# Patient Record
Sex: FEMALE | Race: WHITE | Employment: OTHER | URBAN - METROPOLITAN AREA
[De-identification: names, ages, dates, MRNs, and addresses within clinical notes are randomized per-mention and may not be internally consistent; named-entity substitution may affect disease eponyms.]

---

## 2017-04-13 ENCOUNTER — HOSPITAL ENCOUNTER (OUTPATIENT)
Age: 57
Discharge: HOME | End: 2017-04-13
Payer: COMMERCIAL

## 2017-04-13 ENCOUNTER — APPOINTMENT (OUTPATIENT)
Dept: GENERAL RADIOLOGY | Age: 57
End: 2017-04-13
Attending: PHYSICIAN ASSISTANT
Payer: COMMERCIAL

## 2017-04-13 VITALS
BODY MASS INDEX: 24 KG/M2 | HEART RATE: 80 BPM | SYSTOLIC BLOOD PRESSURE: 174 MMHG | TEMPERATURE: 98 F | RESPIRATION RATE: 16 BRPM | WEIGHT: 150 LBS | OXYGEN SATURATION: 100 % | DIASTOLIC BLOOD PRESSURE: 102 MMHG

## 2017-04-13 DIAGNOSIS — S92.404B OPEN NONDISPLACED FRACTURE OF PHALANX OF RIGHT GREAT TOE, UNSPECIFIED PHALANX, INITIAL ENCOUNTER: Primary | ICD-10-CM

## 2017-04-13 DIAGNOSIS — S91.111A LACERATION OF RIGHT GREAT TOE WITHOUT FOREIGN BODY PRESENT, NAIL DAMAGE STATUS UNSPECIFIED, INITIAL ENCOUNTER: ICD-10-CM

## 2017-04-13 DIAGNOSIS — R03.0 ELEVATED BLOOD-PRESSURE READING WITHOUT DIAGNOSIS OF HYPERTENSION: ICD-10-CM

## 2017-04-13 PROCEDURE — 99204 OFFICE O/P NEW MOD 45 MIN: CPT

## 2017-04-13 PROCEDURE — 12002 RPR S/N/AX/GEN/TRNK2.6-7.5CM: CPT

## 2017-04-13 PROCEDURE — 96372 THER/PROPH/DIAG INJ SC/IM: CPT

## 2017-04-13 PROCEDURE — 28490 TREAT BIG TOE FRACTURE: CPT

## 2017-04-13 PROCEDURE — 73630 X-RAY EXAM OF FOOT: CPT

## 2017-04-13 PROCEDURE — 99205 OFFICE O/P NEW HI 60 MIN: CPT

## 2017-04-13 RX ORDER — IBUPROFEN 600 MG/1
600 TABLET ORAL ONCE
Status: COMPLETED | OUTPATIENT
Start: 2017-04-13 | End: 2017-04-13

## 2017-04-13 RX ORDER — HYDROCODONE BITARTRATE AND ACETAMINOPHEN 5; 325 MG/1; MG/1
1 TABLET ORAL ONCE
Status: DISCONTINUED | OUTPATIENT
Start: 2017-04-13 | End: 2017-04-14

## 2017-04-13 RX ORDER — AMOXICILLIN AND CLAVULANATE POTASSIUM 875; 125 MG/1; MG/1
1 TABLET, FILM COATED ORAL 2 TIMES DAILY
Qty: 20 TABLET | Refills: 0 | Status: SHIPPED | OUTPATIENT
Start: 2017-04-13 | End: 2017-04-23

## 2017-04-13 RX ORDER — HYDROCODONE BITARTRATE AND ACETAMINOPHEN 5; 325 MG/1; MG/1
1-2 TABLET ORAL EVERY 4 HOURS PRN
Qty: 20 TABLET | Refills: 0 | Status: SHIPPED | OUTPATIENT
Start: 2017-04-13 | End: 2017-04-18

## 2017-04-14 NOTE — ED PROVIDER NOTES
Patient Seen in: THE [facility] In [place]    History   Patient presents with:  Lower Extremity Injury (musculoskeletal)    Stated Complaint: R - toe injury    HPI    62 yo female here with c/o a laceration/contusion to the R 1st digit.  PT rep Mother 69     dx 2009 - fully recovered         Smoking Status: Former Smoker                   Packs/Day: 0.00  Years:           Types: Cigarettes    Smokeless Status: Never Used                        Comment: quit 2/05    Alcohol Use: Yes (xcp=54003)    4/13/2017  PROCEDURE:  XR FOOT, COMPLETE (MIN 3 VIEWS), RIGHT (CPT=73630)  TECHNIQUE:  AP, oblique, and lateral views were obtained. COMPARISON:  None.   INDICATIONS:  R toe injury extending to foot  PATIENT STATED HISTORY: (As transcribed b right great toe, unspecified phalanx, initial encounter  (primary encounter diagnosis)  Laceration of right great toe without foreign body present, nail damage status unspecified, initial encounter  Elevated blood-pressure reading without diagnosis of hype

## 2017-04-18 PROBLEM — S91.112A LACERATION OF LEFT GREAT TOE WITHOUT FOREIGN BODY PRESENT OR DAMAGE TO NAIL: Status: ACTIVE | Noted: 2017-04-18

## 2017-04-18 PROBLEM — S91.111A LACERATION OF RIGHT GREAT TOE WITHOUT FOREIGN BODY PRESENT OR DAMAGE TO NAIL: Status: ACTIVE | Noted: 2017-04-18

## 2018-05-09 ENCOUNTER — CHARTING TRANS (OUTPATIENT)
Dept: OTHER | Age: 58
End: 2018-05-09

## 2018-08-29 PROBLEM — I10 ESSENTIAL HYPERTENSION: Status: ACTIVE | Noted: 2018-08-29

## 2018-08-29 PROBLEM — S91.111A LACERATION OF RIGHT GREAT TOE WITHOUT FOREIGN BODY PRESENT OR DAMAGE TO NAIL: Status: RESOLVED | Noted: 2017-04-18 | Resolved: 2018-08-29

## 2018-08-29 PROCEDURE — 88175 CYTOPATH C/V AUTO FLUID REDO: CPT | Performed by: PHYSICIAN ASSISTANT

## 2018-08-29 PROCEDURE — 87624 HPV HI-RISK TYP POOLED RSLT: CPT | Performed by: PHYSICIAN ASSISTANT

## 2018-11-01 VITALS
TEMPERATURE: 99.3 F | WEIGHT: 149.08 LBS | RESPIRATION RATE: 18 BRPM | HEIGHT: 66 IN | SYSTOLIC BLOOD PRESSURE: 138 MMHG | OXYGEN SATURATION: 98 % | HEART RATE: 80 BPM | BODY MASS INDEX: 23.96 KG/M2 | DIASTOLIC BLOOD PRESSURE: 96 MMHG

## 2022-04-14 ENCOUNTER — OFFICE VISIT (OUTPATIENT)
Dept: FAMILY MEDICINE CLINIC | Facility: CLINIC | Age: 62
End: 2022-04-14
Payer: COMMERCIAL

## 2022-04-14 ENCOUNTER — LAB ENCOUNTER (OUTPATIENT)
Dept: LAB | Age: 62
End: 2022-04-14
Attending: FAMILY MEDICINE
Payer: COMMERCIAL

## 2022-04-14 VITALS
DIASTOLIC BLOOD PRESSURE: 82 MMHG | SYSTOLIC BLOOD PRESSURE: 144 MMHG | HEART RATE: 83 BPM | RESPIRATION RATE: 16 BRPM | HEIGHT: 65.25 IN | BODY MASS INDEX: 23.04 KG/M2 | WEIGHT: 140 LBS | TEMPERATURE: 98 F

## 2022-04-14 DIAGNOSIS — Z00.00 ROUTINE PHYSICAL EXAMINATION: Primary | ICD-10-CM

## 2022-04-14 DIAGNOSIS — Z12.11 SCREENING FOR COLON CANCER: ICD-10-CM

## 2022-04-14 DIAGNOSIS — E03.9 HYPOTHYROIDISM, UNSPECIFIED TYPE: ICD-10-CM

## 2022-04-14 DIAGNOSIS — I10 PRIMARY HYPERTENSION: ICD-10-CM

## 2022-04-14 DIAGNOSIS — Z12.31 ENCOUNTER FOR SCREENING MAMMOGRAM FOR BREAST CANCER: ICD-10-CM

## 2022-04-14 LAB
ALBUMIN SERPL-MCNC: 4.5 G/DL (ref 3.4–5)
ALBUMIN/GLOB SERPL: 1.6 {RATIO} (ref 1–2)
ALP LIVER SERPL-CCNC: 76 U/L
ALT SERPL-CCNC: 14 U/L
ANION GAP SERPL CALC-SCNC: 7 MMOL/L (ref 0–18)
AST SERPL-CCNC: 23 U/L (ref 15–37)
BILIRUB SERPL-MCNC: 0.5 MG/DL (ref 0.1–2)
BUN BLD-MCNC: 9 MG/DL (ref 7–18)
BUN/CREAT SERPL: 12.3 (ref 10–20)
CALCIUM BLD-MCNC: 9.6 MG/DL (ref 8.5–10.1)
CHLORIDE SERPL-SCNC: 98 MMOL/L (ref 98–112)
CHOLEST SERPL-MCNC: 241 MG/DL (ref ?–200)
CO2 SERPL-SCNC: 28 MMOL/L (ref 21–32)
CREAT BLD-MCNC: 0.73 MG/DL
DEPRECATED RDW RBC AUTO: 45.6 FL (ref 35.1–46.3)
ERYTHROCYTE [DISTWIDTH] IN BLOOD BY AUTOMATED COUNT: 12.8 % (ref 11–15)
EST. AVERAGE GLUCOSE BLD GHB EST-MCNC: 100 MG/DL (ref 68–126)
FASTING PATIENT LIPID ANSWER: YES
FASTING STATUS PATIENT QL REPORTED: YES
GLOBULIN PLAS-MCNC: 2.8 G/DL (ref 2.8–4.4)
GLUCOSE BLD-MCNC: 79 MG/DL (ref 70–99)
HBA1C MFR BLD: 5.1 % (ref ?–5.7)
HCT VFR BLD AUTO: 45.5 %
HDLC SERPL-MCNC: 107 MG/DL (ref 40–59)
HGB BLD-MCNC: 14.4 G/DL
LDLC SERPL CALC-MCNC: 123 MG/DL (ref ?–100)
MCH RBC QN AUTO: 30.3 PG (ref 26–34)
MCHC RBC AUTO-ENTMCNC: 31.6 G/DL (ref 31–37)
MCV RBC AUTO: 95.6 FL
NONHDLC SERPL-MCNC: 134 MG/DL (ref ?–130)
OSMOLALITY SERPL CALC.SUM OF ELEC: 274 MOSM/KG (ref 275–295)
PLATELET # BLD AUTO: 317 10(3)UL (ref 150–450)
POTASSIUM SERPL-SCNC: 3.9 MMOL/L (ref 3.5–5.1)
PROT SERPL-MCNC: 7.3 G/DL (ref 6.4–8.2)
RBC # BLD AUTO: 4.76 X10(6)UL
SODIUM SERPL-SCNC: 133 MMOL/L (ref 136–145)
TRIGL SERPL-MCNC: 65 MG/DL (ref 30–149)
TSI SER-ACNC: 3.4 MIU/ML (ref 0.36–3.74)
VLDLC SERPL CALC-MCNC: 11 MG/DL (ref 0–30)
WBC # BLD AUTO: 7.5 X10(3) UL (ref 4–11)

## 2022-04-14 PROCEDURE — 83036 HEMOGLOBIN GLYCOSYLATED A1C: CPT

## 2022-04-14 PROCEDURE — 36415 COLL VENOUS BLD VENIPUNCTURE: CPT

## 2022-04-14 PROCEDURE — 3077F SYST BP >= 140 MM HG: CPT | Performed by: FAMILY MEDICINE

## 2022-04-14 PROCEDURE — 84443 ASSAY THYROID STIM HORMONE: CPT

## 2022-04-14 PROCEDURE — 3008F BODY MASS INDEX DOCD: CPT | Performed by: FAMILY MEDICINE

## 2022-04-14 PROCEDURE — 90471 IMMUNIZATION ADMIN: CPT | Performed by: FAMILY MEDICINE

## 2022-04-14 PROCEDURE — 99386 PREV VISIT NEW AGE 40-64: CPT | Performed by: FAMILY MEDICINE

## 2022-04-14 PROCEDURE — 90715 TDAP VACCINE 7 YRS/> IM: CPT | Performed by: FAMILY MEDICINE

## 2022-04-14 PROCEDURE — 80053 COMPREHEN METABOLIC PANEL: CPT

## 2022-04-14 PROCEDURE — 3079F DIAST BP 80-89 MM HG: CPT | Performed by: FAMILY MEDICINE

## 2022-04-14 PROCEDURE — 85027 COMPLETE CBC AUTOMATED: CPT

## 2022-04-14 PROCEDURE — 80061 LIPID PANEL: CPT

## 2022-04-14 RX ORDER — LOSARTAN POTASSIUM 50 MG/1
50 TABLET ORAL DAILY
Qty: 90 TABLET | Refills: 1 | Status: SHIPPED | OUTPATIENT
Start: 2022-04-14

## 2022-04-14 RX ORDER — MELATONIN
1000 DAILY
COMMUNITY

## 2022-04-14 RX ORDER — HYDROCHLOROTHIAZIDE 12.5 MG/1
12.5 TABLET ORAL DAILY
Qty: 90 TABLET | Refills: 1 | Status: SHIPPED | OUTPATIENT
Start: 2022-04-14

## 2022-04-14 RX ORDER — LEVOTHYROXINE SODIUM 137 UG/1
137 TABLET ORAL DAILY
Qty: 90 TABLET | Refills: 1 | Status: SHIPPED | OUTPATIENT
Start: 2022-04-14 | End: 2023-04-09

## 2022-05-04 ENCOUNTER — HOSPITAL ENCOUNTER (OUTPATIENT)
Dept: MAMMOGRAPHY | Age: 62
Discharge: HOME | End: 2022-05-04
Attending: FAMILY MEDICINE
Payer: COMMERCIAL

## 2022-05-04 DIAGNOSIS — Z12.31 ENCOUNTER FOR SCREENING MAMMOGRAM FOR BREAST CANCER: ICD-10-CM

## 2022-05-04 PROCEDURE — 77063 BREAST TOMOSYNTHESIS BI: CPT | Performed by: FAMILY MEDICINE

## 2022-05-04 PROCEDURE — 77067 SCR MAMMO BI INCL CAD: CPT | Performed by: FAMILY MEDICINE

## 2022-11-13 RX ORDER — LEVOTHYROXINE SODIUM 137 UG/1
137 TABLET ORAL DAILY
Qty: 90 TABLET | Refills: 1 | Status: SHIPPED | OUTPATIENT
Start: 2022-11-13 | End: 2023-05-12

## 2022-11-13 NOTE — TELEPHONE ENCOUNTER
Please review. Protocol Failed or has No Protocol. Requested Prescriptions   Pending Prescriptions Disp Refills    hydroCHLOROthiazide 12.5 MG Oral Tab 90 tablet 1     Sig: Take 1 tablet (12.5 mg total) by mouth daily. Hypertensive Medications Protocol Failed - 11/12/2022  6:02 AM        Failed - Last BP reading less than 140/90     BP Readings from Last 1 Encounters:  04/14/22 : 144/82              Failed - CMP or BMP in past 6 months     No results found for this or any previous visit (from the past 4392 hour(s)).             Failed - In person appointment or virtual visit in the past 6 months     Recent Outpatient Visits              7 months ago Routine physical examination    [address], [place], [place]    Office Visit    1 year ago Acquired hypothyroidism    Internal Medicine - Diana Gerardo MD    Telemedicine    1 year ago Multiple benign nevi    Dermatology - Cr Cadet MD    Office Visit    2 years ago Need for influenza vaccination    Internal Medicine - Diana Gerardo MD    Office Visit    3 years ago Screening for breast cancer    Internal Medicine - Diana Gerardo MD    Office Visit                      Passed - In person appointment in the past 12 or next 3 months     Recent Outpatient Visits              7 months ago Routine physical examination    [address] [facility], [address] [place] [address]    Office Visit    1 year ago Acquired hypothyroidism    Internal Medicine - Diana Gerardo MD    Telemedicine    1 year ago Multiple benign nevi    Dermatology - Cr Cadet MD    Office Visit    2 years ago Need for influenza vaccination    Internal Medicine - Diana Gerardo MD    Office Visit    3 years ago Screening for breast cancer Internal Medicine - Suzanna Salinas MD    Office Visit                      Passed [facility] or [facility] > 50     GFR Evaluation  GFRNAA: 88 , resulted on 4/14/2022            losartan 50 MG Oral Tab 90 tablet 1     Sig: Take 1 tablet (50 mg total) by mouth daily. Hypertensive Medications Protocol Failed - 11/12/2022  6:02 AM        Failed - Last BP reading less than 140/90     BP Readings from Last 1 Encounters:  04/14/22 : 144/82              Failed - CMP or BMP in past 6 months     No results found for this or any previous visit (from the past 4392 hour(s)).             Failed - In person appointment or virtual visit in the past 6 months     Recent Outpatient Visits              7 months ago [place]    [place] [facility], [place] [address], [place], [place]    Office Visit    1 year ago Acquired hypothyroidism    Internal Medicine - Lashonda Simeon MD    Telemedicine    1 year ago Multiple benign nevi    Dermatology - Hang Venegas MD    Office Visit    2 years ago Need for influenza vaccination    Internal Medicine - Suzanna Salinas MD    Office Visit    3 years ago Screening for breast cancer    Internal Medicine - Suzanna Salinas MD    Office Visit                      Passed - In person appointment in the past 12 or next 3 months     Recent Outpatient Visits              7 months ago Routine physical examination    [facility], [facility] [place], [place]    Office Visit    1 year ago Acquired hypothyroidism    Internal Medicine - Suzanna Salinas MD    Telemedicine    1 year ago Multiple benign nevi    Dermatology - Hang Venegas MD    Office Visit    2 years ago Need for influenza vaccination    Internal Medicine - Nic Simeon [facility], MD    Office Visit    3 years ago Screening for breast cancer    Internal Medicine - Michelle Villalba MD    Office Visit                      Passed [facility] or [facility] > 50     GFR Evaluation  GFRNAA: 88 , resulted on 4/14/2022            levothyroxine 137 MCG Oral Tab 90 tablet 1     Sig: Take 137 mcg by mouth daily.        Thyroid Medication Protocol Passed - 11/12/2022  6:02 AM        Passed - TSH in past 12 months        Passed - Last TSH value is normal     Lab Results   Component Value Date    TSH 3.400 04/14/2022                 Passed - In person appointment or virtual visit in the past 12 mos or appointment in next 3 mos     Recent Outpatient Visits              7 months ago Routine physical examination    [place] [facility] [facility], [address], [place], [place]    Office Visit    1 year ago Acquired hypothyroidism    Internal Medicine - Michelle Villalba MD    Telemedicine    1 year ago Multiple benign nevi    Dermatology - Roopa Addison, Karolina Crocker MD    Office Visit    2 years ago Need for influenza vaccination    Internal Medicine - Michelle Villalba MD    Office Visit    3 years ago Screening for breast cancer    Internal Medicine - Michelle Villalba MD    Office Visit                               Recent Outpatient Visits              7 months ago Routine physical examination    [facility] [address], [place], [place]    Office Visit    1 year ago Acquired hypothyroidism    Internal Medicine - Michelle Villalba MD    Telemedicine    1 year ago Multiple benign nevi    Dermatology - Chel Martinez MD    Office Visit    2 years ago Need for influenza vaccination    Internal Medicine - Michelle Villalba MD    Office Visit    3 years ago Screening for breast cancer    Internal Medicine - Miles Bajwa MD    Office Visit

## 2022-11-14 RX ORDER — LOSARTAN POTASSIUM 50 MG/1
50 TABLET ORAL DAILY
Qty: 90 TABLET | Refills: 1 | Status: SHIPPED | OUTPATIENT
Start: 2022-11-14

## 2022-11-14 RX ORDER — HYDROCHLOROTHIAZIDE 12.5 MG/1
12.5 TABLET ORAL DAILY
Qty: 90 TABLET | Refills: 1 | Status: SHIPPED | OUTPATIENT
Start: 2022-11-14

## 2022-12-01 ENCOUNTER — NURSE TRIAGE (OUTPATIENT)
Dept: FAMILY MEDICINE CLINIC | Facility: CLINIC | Age: 62
End: 2022-12-01

## 2022-12-01 LAB — AMB EXT COVID-19 RESULT: DETECTED

## 2023-01-13 ENCOUNTER — TELEPHONE (OUTPATIENT)
Dept: FAMILY MEDICINE CLINIC | Facility: CLINIC | Age: 63
End: 2023-01-13

## 2023-01-13 RX ORDER — LORAZEPAM 0.5 MG/1
0.5 TABLET ORAL EVERY 6 HOURS PRN
Qty: 30 TABLET | Refills: 0 | Status: SHIPPED | OUTPATIENT
Start: 2023-01-13

## 2023-01-13 NOTE — TELEPHONE ENCOUNTER
Per patient, her significant other passed away,  will be on Monday and really struggling right now, heard crying, requesting prescription medication to help her calm down,with strong family support,pharmacy verified. Advised urgent care or immediate care  for worsening symptoms .       Please reply to pool: ANNE Strauss

## 2023-01-13 NOTE — TELEPHONE ENCOUNTER
Please let her know we are very sorry. Rx sent to pharmacy for Ativan. Can take every 6-8 hours as needed. Causes tiredness. Do not drive after taking this medication. Do not combine with alcohol. Take precautions not to fall. Can be used short-term for a few days or weeks, but we avoid long-term use as this can be habit-forming.

## 2023-02-15 ENCOUNTER — NURSE TRIAGE (OUTPATIENT)
Dept: FAMILY MEDICINE CLINIC | Facility: CLINIC | Age: 63
End: 2023-02-15

## 2023-02-21 ENCOUNTER — OFFICE VISIT (OUTPATIENT)
Dept: FAMILY MEDICINE CLINIC | Facility: CLINIC | Age: 63
End: 2023-02-21

## 2023-02-21 VITALS
RESPIRATION RATE: 16 BRPM | SYSTOLIC BLOOD PRESSURE: 174 MMHG | HEART RATE: 89 BPM | WEIGHT: 150 LBS | BODY MASS INDEX: 25 KG/M2 | TEMPERATURE: 98 F | DIASTOLIC BLOOD PRESSURE: 87 MMHG

## 2023-02-21 DIAGNOSIS — I10 PRIMARY HYPERTENSION: Primary | ICD-10-CM

## 2023-02-21 PROCEDURE — 99213 OFFICE O/P EST LOW 20 MIN: CPT | Performed by: FAMILY MEDICINE

## 2023-02-21 PROCEDURE — 3077F SYST BP >= 140 MM HG: CPT | Performed by: FAMILY MEDICINE

## 2023-02-21 PROCEDURE — 3079F DIAST BP 80-89 MM HG: CPT | Performed by: FAMILY MEDICINE

## 2023-02-21 RX ORDER — LOSARTAN POTASSIUM 100 MG/1
100 TABLET ORAL DAILY
Qty: 90 TABLET | Refills: 1 | Status: SHIPPED | OUTPATIENT
Start: 2023-02-21

## 2023-02-23 ENCOUNTER — PATIENT MESSAGE (OUTPATIENT)
Dept: FAMILY MEDICINE CLINIC | Facility: CLINIC | Age: 63
End: 2023-02-23

## 2023-02-23 NOTE — TELEPHONE ENCOUNTER
From: Julieta Rebolledo  To: Brandie CesargleDO  Sent: 2/23/2023 11:17 AM CST  Subject: BP Readings    Dr Ron Rios,  First and foremost, thank you for the time you spent with me on Tuesday. I knew the office would take John Paul's death very hard. Please share my gratitude for how much you and Dr Filippo Echeverria did for him. He truly loved you both! As promised, my blood pressure readings by date and time are attached. Any thoughts on it not changing with the increase in dose? Stress I am sure.   Again thank you,  Dory Youngblood  [phone]

## 2023-04-18 ENCOUNTER — PATIENT MESSAGE (OUTPATIENT)
Dept: FAMILY MEDICINE CLINIC | Facility: CLINIC | Age: 63
End: 2023-04-18

## 2023-04-25 RX ORDER — LOSARTAN POTASSIUM 100 MG/1
100 TABLET ORAL DAILY
Qty: 90 TABLET | Refills: 1 | Status: CANCELLED | OUTPATIENT
Start: 2023-04-25

## 2023-04-25 RX ORDER — LEVOTHYROXINE SODIUM 137 UG/1
137 TABLET ORAL DAILY
Qty: 90 TABLET | Refills: 1 | Status: CANCELLED | OUTPATIENT
Start: 2023-04-25 | End: 2023-10-22

## 2023-04-25 RX ORDER — HYDROCHLOROTHIAZIDE 12.5 MG/1
12.5 TABLET ORAL DAILY
Qty: 90 TABLET | Refills: 1 | Status: CANCELLED | OUTPATIENT
Start: 2023-04-25

## 2023-04-27 RX ORDER — HYDROCHLOROTHIAZIDE 12.5 MG/1
12.5 TABLET ORAL DAILY
Qty: 90 TABLET | Refills: 1 | OUTPATIENT
Start: 2023-04-27

## 2023-04-27 RX ORDER — LEVOTHYROXINE SODIUM 137 UG/1
137 TABLET ORAL DAILY
Qty: 90 TABLET | Refills: 0 | Status: SHIPPED | OUTPATIENT
Start: 2023-04-27

## 2023-04-27 RX ORDER — HYDROCHLOROTHIAZIDE 12.5 MG/1
25 TABLET ORAL DAILY
Qty: 180 TABLET | Refills: 0 | Status: SHIPPED | OUTPATIENT
Start: 2023-04-27

## 2023-04-27 NOTE — TELEPHONE ENCOUNTER
Please review; protocol failed/ no protocol  Patient was instructed to increase Hydrochlorothiazide to 2 tablets per [employer] message on 2/23/23. Medication pended for your review and approval.    Requested Prescriptions   Pending Prescriptions Disp Refills    HYDROCHLOROTHIAZIDE 12.5 MG Oral Tab [Pharmacy Med Name: HYDROCHLOROTHIAZIDE 12.5MG TABLETS] 90 tablet 1     Sig: TAKE 1 TABLET(12.5 MG) BY MOUTH DAILY       Hypertensive Medications Protocol Failed - 4/26/2023  9:19 AM        Failed - Last BP reading less than 140/90     BP Readings from Last 1 Encounters:  02/21/23 : (!) 174/87                Failed - CMP or BMP in past 6 months     No results found for this or any previous visit (from the past 4392 hour(s)).               Failed - EGFRCR or GFRNAA > 50     GFR Evaluation              Passed - In person appointment in the past 12 or next 3 months     Recent Outpatient Visits              2 months ago Primary hypertension    [facility], [facility], [place], [place]    Office Visit    1 year ago Routine physical examination    [facility] [place]    Office Visit    1 year ago Acquired hypothyroidism    Internal Medicine - [facility], [place] E[facility] MD    Telemedicine    2 years ago Multiple benign nevi    Dermatology - Beto Vagras MD    Office Visit    2 years ago Need for influenza vaccination    Internal Medicine - Reema Singleton MD    Office Visit          Future Appointments         Provider Department Appt Notes    In 1 week Craig Mcmahon, DO [place], [facility], [address] - In person appointment or virtual visit in the past 6 months     Recent Outpatient Visits              2 months ago Primary hypertension    [facility] [place],     Office Visit    1 year ago Routine physical examination    [facility] [place], [place]    Office Visit    1 year ago Acquired hypothyroidism    Internal Medicine - Nic Taylor MD    Telemedicine    2 years ago Multiple benign nevi    Dermatology - Lizz Mao MD    Office Visit    2 years ago Need for influenza vaccination    Internal Medicine - Racquel Xiao MD    Office Visit          Future Appointments         Provider Department Appt Notes    In 1 week Jennifer Segura DO [facility], [address], [address]                 LEVOTHYROXINE 137 MCG Oral Tab [Pharmacy Med Name: LEVOTHYROXINE 0.137MG (137MCG) TAB] 90 tablet 1     Sig: TAKE 1 TABLET BY MOUTH DAILY       Thyroid Medication Protocol Failed - 4/26/2023  9:19 AM        Failed - TSH in past 12 months        Passed - Last TSH value is normal     Lab Results   Component Value Date    TSH 3.400 04/14/2022                 Passed - In person appointment or virtual visit in the past 12 mos or appointment in next 3 mos     Recent Outpatient Visits              2 months ago Primary hypertension    [address], [address], [place], [place]    Office Visit    1 year ago Routine physical examination    [address], [address], [place], [place]    Office Visit    1 year ago Acquired hypothyroidism    Internal Medicine - Racquel Xiao MD    Telemedicine    2 years ago Multiple benign nevi    Dermatology - Lizz Mao MD    Office Visit    2 years ago Need for influenza vaccination    Internal Medicine - Racquel Xiao MD    Office Visit          Future Appointments         Provider Department Appt Notes    In 1 week Israel Abad [address], [facility]

## 2023-05-03 ENCOUNTER — LAB ENCOUNTER (OUTPATIENT)
Dept: LAB | Age: 63
End: 2023-05-03
Attending: FAMILY MEDICINE
Payer: COMMERCIAL

## 2023-05-03 PROCEDURE — 83036 HEMOGLOBIN GLYCOSYLATED A1C: CPT | Performed by: FAMILY MEDICINE

## 2023-05-03 PROCEDURE — 85025 COMPLETE CBC W/AUTO DIFF WBC: CPT | Performed by: FAMILY MEDICINE

## 2023-05-03 PROCEDURE — 80061 LIPID PANEL: CPT | Performed by: FAMILY MEDICINE

## 2023-05-03 PROCEDURE — 36415 COLL VENOUS BLD VENIPUNCTURE: CPT | Performed by: FAMILY MEDICINE

## 2023-05-03 PROCEDURE — 80053 COMPREHEN METABOLIC PANEL: CPT | Performed by: FAMILY MEDICINE

## 2023-05-03 PROCEDURE — 84443 ASSAY THYROID STIM HORMONE: CPT | Performed by: FAMILY MEDICINE

## 2023-05-04 ENCOUNTER — OFFICE VISIT (OUTPATIENT)
Dept: FAMILY MEDICINE CLINIC | Facility: CLINIC | Age: 63
End: 2023-05-04

## 2023-05-04 VITALS
WEIGHT: 148 LBS | TEMPERATURE: 97 F | RESPIRATION RATE: 16 BRPM | BODY MASS INDEX: 24.36 KG/M2 | SYSTOLIC BLOOD PRESSURE: 157 MMHG | DIASTOLIC BLOOD PRESSURE: 88 MMHG | HEIGHT: 65.25 IN | HEART RATE: 76 BPM

## 2023-05-04 DIAGNOSIS — Z12.11 SCREENING FOR COLON CANCER: Primary | ICD-10-CM

## 2023-05-04 DIAGNOSIS — E03.9 HYPOTHYROIDISM, UNSPECIFIED TYPE: ICD-10-CM

## 2023-05-04 DIAGNOSIS — I10 PRIMARY HYPERTENSION: ICD-10-CM

## 2023-05-04 DIAGNOSIS — Z12.31 ENCOUNTER FOR SCREENING MAMMOGRAM FOR BREAST CANCER: ICD-10-CM

## 2023-05-04 DIAGNOSIS — Z00.00 ROUTINE PHYSICAL EXAMINATION: ICD-10-CM

## 2023-05-04 PROCEDURE — 3008F BODY MASS INDEX DOCD: CPT | Performed by: FAMILY MEDICINE

## 2023-05-04 PROCEDURE — 3077F SYST BP >= 140 MM HG: CPT | Performed by: FAMILY MEDICINE

## 2023-05-04 PROCEDURE — 99396 PREV VISIT EST AGE 40-64: CPT | Performed by: FAMILY MEDICINE

## 2023-05-04 PROCEDURE — 3079F DIAST BP 80-89 MM HG: CPT | Performed by: FAMILY MEDICINE

## 2023-05-04 RX ORDER — AMLODIPINE BESYLATE 5 MG/1
5 TABLET ORAL DAILY
Qty: 90 TABLET | Refills: 1 | Status: SHIPPED | OUTPATIENT
Start: 2023-05-04

## 2023-06-04 ENCOUNTER — HOSPITAL ENCOUNTER (OUTPATIENT)
Dept: MAMMOGRAPHY | Facility: HOSPITAL | Age: 63
End: 2023-06-04
Attending: FAMILY MEDICINE
Payer: COMMERCIAL

## 2023-06-04 ENCOUNTER — HOSPITAL ENCOUNTER (OUTPATIENT)
Dept: MAMMOGRAPHY | Facility: HOSPITAL | Age: 63
Discharge: HOME | End: 2023-06-04
Attending: FAMILY MEDICINE
Payer: COMMERCIAL

## 2023-06-04 DIAGNOSIS — Z12.31 ENCOUNTER FOR SCREENING MAMMOGRAM FOR BREAST CANCER: ICD-10-CM

## 2023-06-04 PROCEDURE — 77067 SCR MAMMO BI INCL CAD: CPT | Performed by: FAMILY MEDICINE

## 2023-06-04 PROCEDURE — 77063 BREAST TOMOSYNTHESIS BI: CPT | Performed by: FAMILY MEDICINE

## 2023-06-05 ENCOUNTER — PATIENT MESSAGE (OUTPATIENT)
Dept: FAMILY MEDICINE CLINIC | Facility: CLINIC | Age: 63
End: 2023-06-05

## 2023-06-05 ENCOUNTER — OFFICE VISIT (OUTPATIENT)
Dept: FAMILY MEDICINE CLINIC | Facility: CLINIC | Age: 63
End: 2023-06-05

## 2023-06-05 VITALS
DIASTOLIC BLOOD PRESSURE: 91 MMHG | RESPIRATION RATE: 16 BRPM | TEMPERATURE: 98 F | BODY MASS INDEX: 25 KG/M2 | WEIGHT: 150 LBS | SYSTOLIC BLOOD PRESSURE: 164 MMHG | HEART RATE: 87 BPM

## 2023-06-05 DIAGNOSIS — E03.9 HYPOTHYROIDISM, UNSPECIFIED TYPE: ICD-10-CM

## 2023-06-05 DIAGNOSIS — I10 ESSENTIAL HYPERTENSION: Primary | ICD-10-CM

## 2023-06-05 PROCEDURE — 3077F SYST BP >= 140 MM HG: CPT | Performed by: FAMILY MEDICINE

## 2023-06-05 PROCEDURE — 99214 OFFICE O/P EST MOD 30 MIN: CPT | Performed by: FAMILY MEDICINE

## 2023-06-05 PROCEDURE — 3080F DIAST BP >= 90 MM HG: CPT | Performed by: FAMILY MEDICINE

## 2023-06-05 RX ORDER — LEVOTHYROXINE SODIUM 0.12 MG/1
125 TABLET ORAL
Qty: 90 TABLET | Refills: 1 | Status: SHIPPED | OUTPATIENT
Start: 2023-06-05

## 2023-06-08 ENCOUNTER — NURSE ONLY (OUTPATIENT)
Facility: CLINIC | Age: 63
End: 2023-06-08

## 2023-06-13 NOTE — PROGRESS NOTES
GI Staff:     Please schedule: Colonoscopy with MAC     Please pend split dose Golytely bowel prep. --Buy over the counter dulcolax laxative, and take one tablet daily for 3 days prior to drinking the bowel prep. Diagnosis: Screening    Medication adjustments:  Day before procedure, hold: NONE  Day of procedure, hold: NONE    >>>Please inform patient if new medications are started after scheduling procedure they need to call clinic to notify us.

## 2023-06-17 NOTE — PROGRESS NOTES
This is the third attempt to reach this pt in regards to scheduling with NO success scheduling procedure. Letter sent via mail and [employer]. TE closed.

## 2023-06-29 ENCOUNTER — PATIENT MESSAGE (OUTPATIENT)
Dept: FAMILY MEDICINE CLINIC | Facility: CLINIC | Age: 63
End: 2023-06-29

## 2023-06-30 RX ORDER — AMLODIPINE BESYLATE 10 MG/1
10 TABLET ORAL DAILY
Qty: 90 TABLET | Refills: 1 | Status: SHIPPED | OUTPATIENT
Start: 2023-06-30

## 2023-07-20 ENCOUNTER — PATIENT MESSAGE (OUTPATIENT)
Dept: FAMILY MEDICINE CLINIC | Facility: CLINIC | Age: 63
End: 2023-07-20

## 2023-07-24 ENCOUNTER — LAB ENCOUNTER (OUTPATIENT)
Dept: LAB | Age: 63
End: 2023-07-24
Attending: FAMILY MEDICINE
Payer: COMMERCIAL

## 2023-07-24 DIAGNOSIS — E03.9 HYPOTHYROIDISM, UNSPECIFIED TYPE: Primary | ICD-10-CM

## 2023-07-24 DIAGNOSIS — E03.9 HYPOTHYROIDISM, UNSPECIFIED TYPE: ICD-10-CM

## 2023-07-24 LAB — TSI SER-ACNC: 0.07 MIU/ML (ref 0.36–3.74)

## 2023-07-24 PROCEDURE — 36415 COLL VENOUS BLD VENIPUNCTURE: CPT

## 2023-07-24 PROCEDURE — 84443 ASSAY THYROID STIM HORMONE: CPT

## 2023-07-24 RX ORDER — LEVOTHYROXINE SODIUM 0.1 MG/1
100 TABLET ORAL DAILY
Qty: 90 TABLET | Refills: 0 | Status: SHIPPED | OUTPATIENT
Start: 2023-07-24 | End: 2024-07-18

## 2023-07-24 RX ORDER — HYDROCHLOROTHIAZIDE 12.5 MG/1
25 TABLET ORAL DAILY
Qty: 180 TABLET | Refills: 0 | Status: SHIPPED | OUTPATIENT
Start: 2023-07-24

## 2023-08-04 RX ORDER — LEVOTHYROXINE SODIUM 137 UG/1
137 TABLET ORAL DAILY
Qty: 90 TABLET | Refills: 0 | OUTPATIENT
Start: 2023-08-04

## 2023-08-04 NOTE — TELEPHONE ENCOUNTER
Per test results:      Hi Brii,     Your TSH is still low. Lets decrease your dosage of levothyroxine again. Please stop the levothyroxine 125 mcg and start taking levothyroxine 100 mcg. A script for levothyroxine 100 mcg daily has been sent to your pharmacy. Lets plan on rechecking your thyroid function again in 4 weeks. Orders have been placed.  Please let me know if you have any questions  - STEFFANY Buckner   Written by MARIA Buckner on 7/24/2023 11:55 AM CDT  Seen by patient Blanca Chino on 7/24/2023 12:46 PM

## 2023-10-04 ENCOUNTER — PATIENT MESSAGE (OUTPATIENT)
Dept: FAMILY MEDICINE CLINIC | Facility: CLINIC | Age: 63
End: 2023-10-04

## 2023-10-10 ENCOUNTER — LAB ENCOUNTER (OUTPATIENT)
Dept: LAB | Age: 63
End: 2023-10-10
Payer: COMMERCIAL

## 2023-10-10 DIAGNOSIS — E03.9 HYPOTHYROIDISM, UNSPECIFIED TYPE: ICD-10-CM

## 2023-10-10 LAB — TSI SER-ACNC: 1.94 MIU/ML (ref 0.36–3.74)

## 2023-10-10 PROCEDURE — 36415 COLL VENOUS BLD VENIPUNCTURE: CPT

## 2023-10-10 PROCEDURE — 84443 ASSAY THYROID STIM HORMONE: CPT

## 2023-10-10 RX ORDER — LOSARTAN POTASSIUM AND HYDROCHLOROTHIAZIDE 25; 100 MG/1; MG/1
1 TABLET ORAL DAILY
Qty: 90 TABLET | Refills: 3 | Status: SHIPPED | OUTPATIENT
Start: 2023-10-10 | End: 2024-10-04

## 2023-10-11 DIAGNOSIS — E03.9 HYPOTHYROIDISM, UNSPECIFIED TYPE: ICD-10-CM

## 2023-10-12 RX ORDER — AMLODIPINE BESYLATE 10 MG/1
10 TABLET ORAL DAILY
Qty: 90 TABLET | Refills: 1 | OUTPATIENT
Start: 2023-10-12

## 2023-10-12 RX ORDER — LEVOTHYROXINE SODIUM 0.1 MG/1
100 TABLET ORAL DAILY
Qty: 90 TABLET | Refills: 3 | Status: SHIPPED | OUTPATIENT
Start: 2023-10-12 | End: 2024-10-06

## 2023-10-12 NOTE — TELEPHONE ENCOUNTER
Refill passed per [facility], [facility] protocol. Requested Prescriptions   Pending Prescriptions Disp Refills    levothyroxine 100 MCG Oral Tab 90 tablet 0     Sig: Take 1 tablet (100 mcg total) by mouth daily.        Thyroid Medication Protocol Passed - 10/11/2023  9:11 AM        Passed - TSH in past 12 months        Passed - Last TSH value is normal     Lab Results   Component Value Date    TSH 1.940 10/10/2023                 Passed - In person appointment or virtual visit in the past 12 mos or appointment in next 3 mos     Recent Outpatient Visits              4 months ago Screening for colon cancer    [address], [address], [place]    Nurse Only    4 months ago Essential hypertension    [address], [address], [place], [place], [place]    Office Visit    5 months ago Screening for colon cancer    [address] [facility], [place], [place]    Office Visit    7 months ago Primary hypertension    [facility], [address], [place], [place], [place]    Office Visit    1 year ago Routine physical examination    [address] [facility], [place], [place], [place]    Office Visit          Future Appointments         Provider Department Appt Notes    In 1 month [address], [address], [address], [place] Ciolonoscopy w/MAC @ [address]                         Recent Outpatient Visits              4 months ago Screening for colon cancer    [address], [address], [place]    Nurse Only    4 months ago Essential hypertension    [address], [address], [place], [place], [facility] [place] [facility]    Office Visit    5 months ago Screening for colon cancer    [address] [facility], [place], [place], [place]    Office Visit    7 months ago Primary hypertension    [facility], [address], [place], [place], [place] Office Visit    1 year ago Routine physical examination    [address] [facility], [place], [place]    Office Visit          Future Appointments         Provider Department Appt Notes    In 1 month [address], [address], [place] Ciolonoscopy w/MAC @ [facility]

## 2023-10-13 RX ORDER — HYDROCHLOROTHIAZIDE 12.5 MG/1
25 TABLET ORAL DAILY
Qty: 180 TABLET | Refills: 0 | OUTPATIENT
Start: 2023-10-13

## 2023-10-13 RX ORDER — LOSARTAN POTASSIUM 100 MG/1
100 TABLET ORAL DAILY
Qty: 90 TABLET | Refills: 1 | OUTPATIENT
Start: 2023-10-13

## 2023-10-14 NOTE — TELEPHONE ENCOUNTER
Per chart review, losartan hydrochlorothiazide rx was sent to pharm. Hydrochlorothiazide was removed from active med list on 10/10/23 by Dr. William Farr. Refill not appropriate. The original prescription was discontinued on 10/10/2023 by Jazmin Fernández DO for the following reason: Dose adjustment. Renewing this prescription may not be appropriate.      Requested Prescriptions   Pending Prescriptions Disp Refills    HYDROCHLOROTHIAZIDE 12.5 MG Oral Tab [Pharmacy Med Name: HYDROCHLOROTHIAZIDE 12.5MG TABLETS] 180 tablet 0     Sig: TAKE 2 TABLETS(25 MG) BY MOUTH DAILY       Hypertensive Medications Protocol Failed - 10/12/2023  6:43 AM        Failed - Last BP reading less than 140/90     BP Readings from Last 1 Encounters:  06/05/23 : (!) 164/91              Passed - In person appointment in the past 12 or next 3 months     Recent Outpatient Visits              4 months ago Screening for colon cancer    [address], [address], [place]    Nurse Only    4 months ago Essential hypertension    [address], [facility], [place], [place]    Office Visit    5 months ago Screening for colon cancer    [place], [place]    Office Visit    7 months ago Primary hypertension    [facility] [facility], [facility], [place], [place]    Office Visit    1 year ago Routine physical examination    [place], [place]    Office Visit          Future Appointments         Provider Department Appt Notes    In 1 month [address], [address] [facility] [address], [address], [place] Ciolonoscopy w/MAC @ [facility]                      Passed - CMP or BMP in past 6 months     Recent Results (from the past 4392 hour(s))   Comp Metabolic Panel (14) [E]    Collection Time: 05/03/23 10:37 AM   Result Value Ref Range    Glucose 99 70 - 99 mg/dL    Sodium 132 (L) 136 - 145 mmol/L    Potassium 4.9 3.5 - 5.1 mmol/L    Chloride 98 98 - 112 mmol/L    CO2 24.0 21.0 - 32.0 mmol/L    Anion Gap 10 0 - 18 mmol/L    BUN 8 7 - 18 mg/dL    Creatinine 0.67 0.55 - 1.02 mg/dL    BUN/CREA Ratio 11.9 10.0 - 20.0    Calcium, Total 10.0 8.5 - 10.1 mg/dL    Calculated Osmolality 272 (L) 275 - 295 mOsm/kg    eGFR-Cr 98 >=60 mL/min/1.73m2    ALT 18 13 - 56 U/L    AST 20 15 - 37 U/L    Alkaline Phosphatase 84 50 - 130 U/L    Bilirubin, Total 0.7 0.1 - 2.0 mg/dL    Total Protein 7.3 6.4 - 8.2 g/dL    Albumin 3.9 3.4 - 5.0 g/dL    Globulin  3.4 2.8 - 4.4 g/dL    A/G Ratio 1.1 1.0 - 2.0    Patient Fasting for CMP? Yes      *Note: Due to a large number of results and/or encounters for the requested time period, some results have not been displayed. A complete set of results can be found in Results Review.                Passed - In person appointment or virtual visit in the past 6 months     Recent Outpatient Visits              4 months ago Screening for colon cancer    [address], [address], [place]    Nurse Only    4 months ago Essential hypertension    [address], [address], [place], [place]    Office Visit    5 months ago Screening for colon cancer    [address] [facility], [place], [place]    Office Visit    7 months ago Primary hypertension    [facility], [facility], [place], [place]    Office Visit    1 year ago Routine physical examination    [address] [facility], [place], [place]    Office Visit          Future Appointments         Provider Department Appt Notes    In 1 month [address], [address], [place] Ciolonoscopy w/MAC @ [facility]                      Passed - EGFRCR or GFRNAA > 50     GFR Evaluation  EGFRCR: 98 , resulted on 5/3/2023

## 2023-10-14 NOTE — TELEPHONE ENCOUNTER
Per medication module, losartan-hydrochlorothiazide 100-25mg sent to pharm on 10/10/23 x 1 year supply. Refill request not appropriate.     Requested Prescriptions   Pending Prescriptions Disp Refills    LOSARTAN 100 MG Oral Tab [Pharmacy Med Name: LOSARTAN 100MG TABLETS] 90 tablet 1     Sig: TAKE 1 TABLET(100 MG) BY MOUTH DAILY       Hypertensive Medications Protocol Failed - 10/12/2023  6:43 AM        Failed - Last BP reading less than 140/90     BP Readings from Last 1 Encounters:  06/05/23 : (!) 164/91              Passed - In person appointment in the past 12 or next 3 months     Recent Outpatient Visits              4 months ago Screening for colon cancer    [address], [address], [place]    Nurse Only    4 months ago Essential hypertension    [address], [facility], [place], [place]    Office Visit    5 months ago Screening for colon cancer    [address] [place], [place]    Office Visit    7 months ago Primary hypertension    [facility], [facility], [place], [place]    Office Visit    1 year ago Routine physical examination    [address], [place]    Office Visit          Future Appointments         Provider Department Appt Notes    In 1 month [address], [address] [facility] [address], [address], [place] Ciolonoscopy w/MAC @ [facility]                      Passed - CMP or BMP in past 6 months     Recent Results (from the past 4392 hour(s))   Comp Metabolic Panel (14) [E]    Collection Time: 05/03/23 10:37 AM   Result Value Ref Range    Glucose 99 70 - 99 mg/dL    Sodium 132 (L) 136 - 145 mmol/L    Potassium 4.9 3.5 - 5.1 mmol/L    Chloride 98 98 - 112 mmol/L    CO2 24.0 21.0 - 32.0 mmol/L    Anion Gap 10 0 - 18 mmol/L    BUN 8 7 - 18 mg/dL    Creatinine 0.67 0.55 - 1.02 mg/dL    BUN/CREA Ratio 11.9 10.0 - 20.0 Calcium, Total 10.0 8.5 - 10.1 mg/dL    Calculated Osmolality 272 (L) 275 - 295 mOsm/kg    eGFR-Cr 98 >=60 mL/min/1.73m2    ALT 18 13 - 56 U/L    AST 20 15 - 37 U/L    Alkaline Phosphatase 84 50 - 130 U/L    Bilirubin, Total 0.7 0.1 - 2.0 mg/dL    Total Protein 7.3 6.4 - 8.2 g/dL    Albumin 3.9 3.4 - 5.0 g/dL    Globulin  3.4 2.8 - 4.4 g/dL    A/G Ratio 1.1 1.0 - 2.0    Patient Fasting for CMP? Yes      *Note: Due to a large number of results and/or encounters for the requested time period, some results have not been displayed. A complete set of results can be found in Results Review.                Passed - In person appointment or virtual visit in the past 6 months     Recent Outpatient Visits              4 months ago Screening for colon cancer    [facility], [address], [place]    Nurse Only    4 months ago Essential hypertension    [address] [place] [address], [place], [place]    Office Visit    5 months ago Screening for colon cancer    [facility] [place], [place]    Office Visit    7 months ago Primary hypertension    [facility] [facility], [place] [address], [place], [place]    Office Visit    1 year ago Routine physical examination    [facility] [place] [place]    Office Visit          Future Appointments         Provider Department Appt Notes    In 1 month [address], [address], [place] Ciolonoscopy w/MAC @ [facility]                      Passed - EGFRCR or GFRNAA > 50     GFR Evaluation  EGFRCR: 98 , resulted on 5/3/2023

## 2023-10-31 ENCOUNTER — TELEPHONE (OUTPATIENT)
Facility: CLINIC | Age: 63
End: 2023-10-31

## 2023-10-31 DIAGNOSIS — Z12.11 SCREEN FOR COLON CANCER: Primary | ICD-10-CM

## 2023-10-31 NOTE — TELEPHONE ENCOUNTER
Patient calling to cancel procedure, states does not want to reschedule for now. Please call to confirm.

## 2023-10-31 NOTE — TELEPHONE ENCOUNTER
CANCELED for:  Colonoscopy [id number]  Provider Name:  Dr Erasmo Hernández  Date:  11/13/2023  Location:  [facility]  Sedation:  MAC  Time:  10:15 am. Electronic case request was sent to [facility] via [employer].   Prep:  Split dose Golytely  Meds/Allergies Reconciled?:  Yes  Diagnosis with codes:  CRC screening Z12.11

## 2023-12-22 RX ORDER — AMLODIPINE BESYLATE 10 MG/1
10 TABLET ORAL DAILY
Qty: 90 TABLET | Refills: 1 | Status: SHIPPED | OUTPATIENT
Start: 2023-12-22

## 2023-12-22 NOTE — TELEPHONE ENCOUNTER
Please review. Protocol failed/ No protocol      Requested Prescriptions   Pending Prescriptions Disp Refills    AMLODIPINE 10 MG Oral Tab [Pharmacy Med Name: AMLODIPINE BESYLATE 10MG TABLETS] 90 tablet 1     Sig: TAKE 1 TABLET(10 MG) BY MOUTH DAILY       Hypertensive Medications Protocol Failed - 12/20/2023 12:50 PM        Failed - Last BP reading less than 140/90     BP Readings from Last 1 Encounters:   06/05/23 (!) 164/91               Failed - CMP or BMP in past 6 months     No results found for this or any previous visit (from the past 4392 hour(s)).             Failed - In person appointment or virtual visit in the past 6 months     Recent Outpatient Visits              6 months ago Screening for colon cancer    Bubba Brown, [address], [place]    Nurse Only    6 months ago Essential hypertension    Bubba Brown, [address] [place], [place]    Office Visit    7 months ago Screening for colon cancer    [address] [place] [address]    Office Visit    10 months ago Primary hypertension    [facility], [place], [place]    Office Visit    1 year ago Routine physical examination    [address] [place] [address], [address] [place] [address] [address] - In person appointment in the past 12 or next 3 months     Recent Outpatient Visits              6 months ago Screening for colon cancer    [address] [place] [address]    Nurse Only    6 months ago Essential hypertension    Bubba Brown, [facility], [place], [place]    Office Visit    7 months ago Screening for colon cancer    [address] [place] [address]    Office Visit    10 months ago Primary hypertension    [address] [place] [address] M, DO    Office Visit    1 year ago Routine physical examination    Cathy Gomez [address] or GFRNAA > 50     GFR Evaluation  EGFRCR: 98 , resulted on 5/3/2023                    Recent Outpatient Visits              6 months ago Screening for colon cancer    [address], [address], [place]    Nurse Only    6 months ago Essential hypertension    [address], [address], [place], [place]    Office Visit    7 months ago Screening for colon cancer    Cathy Gomez [place], [place]    Office Visit    10 months ago Primary hypertension    [address], [address], [place], [place], [place]    Office Visit    1 year ago Routine physical examination    Cathy Gomez [place]    Office Visit

## 2024-01-11 RX ORDER — LOSARTAN POTASSIUM 50 MG/1
50 TABLET ORAL DAILY
Qty: 90 TABLET | Refills: 1 | OUTPATIENT
Start: 2024-01-11

## 2024-01-11 RX ORDER — AMLODIPINE BESYLATE 5 MG/1
5 TABLET ORAL DAILY
Qty: 90 TABLET | Refills: 1 | OUTPATIENT
Start: 2024-01-11

## 2024-01-11 NOTE — TELEPHONE ENCOUNTER
Medication was changed.    Medication Quantity Refills Start End   losartan-hydroCHLOROthiazide 100-25 MG Oral Tab 90 tablet 3 10/10/2023 10/4/2024   Sig:   Take 1 tablet by mouth daily.     Route:   Oral     Order #:   [id number]         Medication Quantity Refills Start End   amLODIPine 10 MG Oral Tab 90 tablet 1 12/22/2023 --   Sig:   Take 1 tablet (10 mg total) by mouth daily.     Route:   Oral     Order #:   [id number]

## 2024-03-08 ENCOUNTER — TELEPHONE (OUTPATIENT)
Dept: FAMILY MEDICINE CLINIC | Facility: CLINIC | Age: 64
End: 2024-03-08

## 2024-03-08 NOTE — TELEPHONE ENCOUNTER
Schedulers:  PCP office requesting we call patient to reschedule canceled colonoscopy  Please see 6/8/23 Gi colon screen appointment.    Thank you

## 2024-03-08 NOTE — TELEPHONE ENCOUNTER
GI staff, pt cancelled her colonoscopy 11/13/23. Please f/u with pt and advise if she needs a new referral from PCP. Thanks.

## 2024-04-10 RX ORDER — AMLODIPINE BESYLATE 10 MG/1
10 TABLET ORAL DAILY
Qty: 90 TABLET | Refills: 0 | Status: SHIPPED | OUTPATIENT
Start: 2024-04-10

## 2024-07-10 NOTE — TELEPHONE ENCOUNTER
Please review. Rx failed/no protocol.    No future appointments.  [employer] message sent to patient to schedule an office visit with PCP.   Routed to [facility] to call patient and make an appointment.    No Active/ Future labs pended - last labs were completed on 5/3/2023    Requested Prescriptions   Pending Prescriptions Disp Refills    AMLODIPINE 10 MG Oral Tab [Pharmacy Med Name: AMLODIPINE BESYLATE 10MG TABLETS] 90 tablet 0     Sig: TAKE 1 TABLET(10 MG) BY MOUTH DAILY       Hypertension Medications Protocol Failed - 7/6/2024  9:44 AM        Failed - CMP or BMP in past 12 months        Failed - Last BP reading less than 140/90     BP Readings from Last 1 Encounters:   06/05/23 (!) 164/91               Failed - In person appointment or virtual visit in the past 12 mos or appointment in next 3 mos     Recent Outpatient Visits              1 year ago Screening for colon cancer    [facility] [place]    Nurse Only    1 year ago Essential hypertension    [facility] Weiler, Colleen M, DO    Office Visit    1 year ago Screening for colon cancer    [facility] Weiler, Colleen M, DO    Office Visit    1 year ago Primary hypertension    [facility] Weiler, Colleen M, DO    Office Visit    2 years ago Routine physical examination    [facility] Weiler, Colleen M, DO    Office Visit                      Failed - EGFRCR or GFRNAA > 50     GFR Evaluation                   Recent Outpatient Visits              1 year ago Screening for colon cancer    [facility] [place]    Nurse Only    1 year ago Essential hypertension    [facility] Weiler, Colleen M, DO    Office Visit    1 year ago Screening for colon cancer    [facility] We[facility], Colleen M, DO    Office Visit    1 year ago Primary hypertension    [facility] Weiler, Colleen M, DO    Office Visit    2 years ago Routine physical examination    [facility], [facility] Weiler, Colleen M, DO    Office Visit

## 2024-07-10 NOTE — TELEPHONE ENCOUNTER
Please call patient to assist in making an appointment.  Thank you    Last office visit: 6/5/23   [employer] message sent to patient as well

## 2024-07-15 RX ORDER — AMLODIPINE BESYLATE 10 MG/1
10 TABLET ORAL DAILY
Qty: 90 TABLET | Refills: 0 | OUTPATIENT
Start: 2024-07-15

## 2024-08-27 LAB — AMB EXT COVID-19 RESULT: DETECTED

## 2024-09-17 ENCOUNTER — PATIENT MESSAGE (OUTPATIENT)
Dept: FAMILY MEDICINE CLINIC | Facility: CLINIC | Age: 64
End: 2024-09-17

## 2024-09-17 DIAGNOSIS — I10 ESSENTIAL HYPERTENSION: Primary | ICD-10-CM

## 2024-09-17 DIAGNOSIS — E03.9 HYPOTHYROIDISM, UNSPECIFIED TYPE: ICD-10-CM

## 2024-09-17 NOTE — TELEPHONE ENCOUNTER
From: Brii Phelps  To: Colleen Weiler  Sent: 9/17/2024 8:44 AM CDT  Subject: Covid Update    Please update my records to include a positive Covid test on August 27. 2024.    Thank you

## 2024-09-20 RX ORDER — LOSARTAN POTASSIUM AND HYDROCHLOROTHIAZIDE 25; 100 MG/1; MG/1
1 TABLET ORAL DAILY
Qty: 90 TABLET | Refills: 0 | Status: CANCELLED | OUTPATIENT
Start: 2024-09-20

## 2024-09-20 RX ORDER — LEVOTHYROXINE SODIUM 100 UG/1
100 TABLET ORAL DAILY
Qty: 90 TABLET | Refills: 0 | Status: SHIPPED | OUTPATIENT
Start: 2024-09-20

## 2024-09-21 RX ORDER — AMLODIPINE BESYLATE 10 MG/1
10 TABLET ORAL DAILY
Qty: 90 TABLET | Refills: 0 | Status: CANCELLED | OUTPATIENT
Start: 2024-09-21

## 2024-09-21 NOTE — TELEPHONE ENCOUNTER
She is due for an appointment.  Can you please call to schedule and then we can refill until then?

## 2024-09-21 NOTE — TELEPHONE ENCOUNTER
Please review; protocol failed/ has no protocol      Please see message below for upcoming appointment.    Future Appointments   Date Time Provider Department Center   10/29/2024 11:15 AM Weiler, Colleen M, DO [facility]           No active /future labs noted       Please see patients MyChart Message     Brii Ahmadi Misaelflores SCH[facility]TE [facility] (supporting Nikolay Kenny DO)2 hours ago (6:16 AM)     CM  I will have no amlodipine to take as of Monday morning.  Please advise.   Requested Prescriptions   Pending Prescriptions Disp Refills    amLODIPine 10 MG Oral Tab 90 tablet 0     Sig: Take 1 tablet (10 mg total) by mouth daily.       Hypertension Medications Protocol Failed - 9/17/2024  8:40 AM        Failed - CMP or BMP in past 12 months        Failed - Last BP reading less than 140/90     BP Readings from Last 1 Encounters:   06/05/23 (!) 164/91               Failed - EGFRCR or GFRNAA > 50     GFR Evaluation            Passed - In person appointment or virtual visit in the past 12 mos or appointment in next 3 mos     Recent Outpatient Visits              1 year ago Screening for colon cancer    [facility]    Nurse Only    1 year ago Essential hypertension    [facility] Weiler, Colleen M,     Office Visit    1 year ago Screening for colon cancer    [facility] Weiler, Colleen M,     Office Visit    1 year ago Primary hypertension    [facility] Weiler, Colleen M,     Office Visit    2 years ago Routine physical examination    [facility] Weiler, Colleen M,     Office Visit          Future Appointments         Provider Department Appt Notes    In 1 month Weiler, Colleen M, DO [facility] Weight gain.  Please advise if bloodwork is needed in  advance.                       Recent Outpatient Visits              1 year ago Screening for colon cancer    [facility], [facility] [place]    Nurse Only    1 year ago Essential hypertension    [facility], [facility] Weiler, Colleen M, DO    Office Visit    1 year ago Screening for colon cancer    [facility], [facility] Weiler, Colleen M, DO    Office Visit    1 year ago Primary hypertension    [facility] Weiler, Colleen M, DO    Office Visit    2 years ago Routine physical examination    [facility] Weiler, Colleen M, DO    Office Visit          Future Appointments         Provider Department Appt Notes    In 1 month Weiler, Colleen M, DO [facility] Weight gain.  Please advise if bloodwork is needed in advance.

## 2024-09-21 NOTE — TELEPHONE ENCOUNTER
Please review.  Protocol failed / Has no protocol.    Asking for refill to get to appointment date:  Future Appointments   Date Time Provider Department Center   10/29/2024 11:15 AM Weiler, Colleen M,  [facility]     No Active/ Future labs pended yet     Requested Prescriptions   Pending Prescriptions Disp Refills    amLODIPine 10 MG Oral Tab 90 tablet 0     Sig: Take 1 tablet (10 mg total) by mouth daily.       Hypertension Medications Protocol Failed - 9/17/2024  8:40 AM        Failed - CMP or BMP in past 12 months        Failed - Last BP reading less than 140/90     BP Readings from Last 1 Encounters:   06/05/23 (!) 164/91               Failed - EGFRCR or GFRNAA > 50     GFR Evaluation            Passed - In person appointment or virtual visit in the past 12 mos or appointment in next 3 mos     Recent Outpatient Visits              1 year ago Screening for colon cancer    [facility]    Nurse Only    1 year ago Essential hypertension    [facility] Weiler, Colleen M, DO    Office Visit    1 year ago Screening for colon cancer    [facility] Weiler, Colleen M, DO    Office Visit    1 year ago Primary hypertension    [facility] Weiler, Colleen M, DO    Office Visit    2 years ago Routine physical examination    [facility] Weiler, Colleen M, DO    Office Visit          Future Appointments         Provider Department Appt Notes    In 1 month Weiler, Colleen M,  [facility] Weight gain.  Please advise if bloodwork is needed in advance.                       Future Appointments         Provider Department Appt Notes    In 1 month Weiler, Colleen M,  [facility] Weight gain.  Please advise if bloodwork is needed in  advance.          Recent Outpatient Visits              1 year ago Screening for colon cancer    [facility], [facility], [place]    Nurse Only    1 year ago Essential hypertension    [facility], [facility] We[place], Colleen M, DO    Office Visit    1 year ago Screening for colon cancer    [facility], [facility], [facility] Weiler, Colleen M, DO    Office Visit    1 year ago Primary hypertension    [facility], [facility], [facility] Weiler, Colleen M, DO    Office Visit    2 years ago Routine physical examination    [facility], [facility], [facility] Weiler, Colleen M, DO    Office Visit

## 2024-09-21 NOTE — TELEPHONE ENCOUNTER
Asking for refill to get to appointment date:  Future Appointments   Date Time Provider Department Center   10/29/2024 11:15 AM Weiler, Colleen M, DO [facility]     Requested Prescriptions   Pending Prescriptions Disp Refills    levothyroxine 100 MCG Oral Tab 90 tablet 0     Sig: Take 1 tablet (100 mcg total) by mouth daily.       Thyroid Medication Protocol Passed - 9/17/2024  8:40 AM        Passed - TSH in past 12 months        Passed - Last TSH value is normal     Lab Results   Component Value Date    TSH 1.940 10/10/2023                 Passed - In person appointment or virtual visit in the past 12 mos or appointment in next 3 mos     Recent Outpatient Visits              1 year ago [facility]    Nurse Only    1 year ago Essential hypertension    [facility] Weiler, Colleen M, DO    Office Visit    1 year ago Screening for colon cancer    [facility] Weiler, Colleen M, DO    Office Visit    1 year ago Primary hypertension    [facility] Weiler, Colleen M, DO    Office Visit    2 years ago Routine physical examination    [facility] Weiler, Colleen M, DO    Office Visit          Future Appointments         Provider Department Appt Notes    In 1 month Weiler, Colleen M, DO [facility] Weight gain.  Please advise if bloodwork is needed in advance.                       Future Appointments         Provider Department Appt Notes    In 1 month Weiler, Colleen M, DO [facility] Weight gain.  Please advise if bloodwork is needed in advance.           Recent Outpatient Visits              1 year ago [facility] for colon [facility]    [facility]    Nurse Only    1  year ago Essential hypertension    [facility], Colleen M, DO    Office Visit    1 year ago Screening for colon cancer    [facility] Weiler, Colleen M, DO    Office Visit    1 year ago Primary hypertension    [facility] Weiler, Colleen M, DO    Office Visit    2 years ago Routine physical examination    [facility] Weiler, Colleen M, DO    Office Visit

## 2024-09-21 NOTE — TELEPHONE ENCOUNTER
Please review.  Protocol failed / Has no protocol.     Asking for refill to get to appointment date:  Future Appointments   Date Time Provider Department Center   10/29/2024 11:15 AM Weiler, Colleen M,  [facility]     No Active/ Future labs pended yet    Requested Prescriptions   Pending Prescriptions Disp Refills    losartan-hydroCHLOROthiazide 100-25 MG Oral Tab 90 tablet 0     Sig: Take 1 tablet by mouth daily.       Hypertension Medications Protocol Failed - 9/17/2024  8:40 AM        Failed - CMP or BMP in past 12 months        Failed - Last BP reading less than 140/90     BP Readings from Last 1 Encounters:   06/05/23 (!) 164/91               Failed - EGFRCR or GFRNAA > 50     GFR Evaluation            Passed - In person appointment or virtual visit in the past 12 mos or appointment in next 3 mos     Recent Outpatient Visits              1 year ago Screening for colon cancer    [facility]    Nurse Only    1 year ago Essential hypertension    [facility] Weiler, Colleen M, DO    Office Visit    1 year ago Screening for colon cancer    [facility] Weiler, Colleen M, DO    Office Visit    1 year ago Primary hypertension    [facility] Weiler, Colleen M, DO    Office Visit    2 years ago Routine physical examination    [facility] Weiler, Colleen M, DO    Office Visit          Future Appointments         Provider Department Appt Notes    In 1 month Weiler, Colleen M,  [facility] Weight gain.  Please advise if bloodwork is needed in advance.                       Future Appointments         Provider Department Appt Notes    In 1 month Weiler, Colleen M,  [facility] Weight gain.  Please advise if bloodwork is needed  in advance.          Recent Outpatient Visits              1 year ago Screening for colon cancer    [facility], [facility] [place]    Nurse Only    1 year ago Essential hypertension    [facility] [place], Col[facility] M, DO    Office Visit    1 year ago Screening for colon cancer    [facility] We[place], Colleen M, DO    Office Visit    1 year ago Primary hypertension    [facility] We[place], Colleen M, DO    Office Visit    2 years ago Routine physical examination    [facility] [place], Colleen M, DO    Office Visit

## 2024-09-23 ENCOUNTER — PATIENT MESSAGE (OUTPATIENT)
Dept: FAMILY MEDICINE CLINIC | Facility: CLINIC | Age: 64
End: 2024-09-23

## 2024-09-23 RX ORDER — LOSARTAN POTASSIUM AND HYDROCHLOROTHIAZIDE 25; 100 MG/1; MG/1
1 TABLET ORAL DAILY
Qty: 30 TABLET | Refills: 1 | Status: SHIPPED | OUTPATIENT
Start: 2024-09-23

## 2024-09-23 RX ORDER — LOSARTAN POTASSIUM AND HYDROCHLOROTHIAZIDE 25; 100 MG/1; MG/1
1 TABLET ORAL DAILY
Qty: 90 TABLET | Refills: 0 | Status: SHIPPED | OUTPATIENT
Start: 2024-09-23 | End: 2025-09-18

## 2024-09-23 RX ORDER — AMLODIPINE BESYLATE 10 MG/1
10 TABLET ORAL DAILY
Qty: 90 TABLET | Refills: 0 | Status: SHIPPED | OUTPATIENT
Start: 2024-09-23

## 2024-09-23 NOTE — TELEPHONE ENCOUNTER
Dr Weiler ---   Patient has appointment 10/29/24. Would you need a sooner appointment, or okay to refill losartan-hydrochlorothiazide until then?         Future Appointments   Date Time Provider Department Center   10/29/2024 11:15 AM [facility]

## 2024-09-23 NOTE — TELEPHONE ENCOUNTER
Please review; protocol failed/ has no protocol      No active /future labs noted   Please see patients MyChart message       aleisha Galdino SCHULTE Em Rn Triage (supporting Colleen M Weiler, DO)3 hours ago (9:28 AM)     CM  Dr Weiler,     I believe a request for the following refills was routed to you.  At this time I am out of Amloipine so am taking 2 5mg tablets a day.     amodipine 10 mg  losartan/hctz 100/25 mg     Please advise.  Thanks,     Brianna Phelps  Requested Prescriptions   Pending Prescriptions Disp Refills    losartan-hydroCHLOROthiazide 100-25 MG Oral Tab 90 tablet 3     Sig: Take 1 tablet by mouth daily.       Hypertension Medications Protocol Failed - 9/23/2024 12:58 PM        Failed - CMP or BMP in past 12 months        Failed - Last BP reading less than 140/90     BP Readings from Last 1 Encounters:   06/05/23 (!) 164/91               Failed - EGFRCR or GFRNAA > 50     GFR Evaluation            Passed - In person appointment or virtual visit in the past 12 mos or appointment in next 3 mos     Recent Outpatient Visits              1 year ago Screening for colon [facility]    [facility] [place]    Nurse Only    1 year ago Essential hypertension    [facility] Weiler, Colleen M, DO    Office Visit    1 year ago Screening for colon cancer    [facility] Weiler, Colleen M, DO    Office Visit    1 year ago Primary hypertension    [facility] Weiler, Colleen M, DO    Office Visit    2 years ago Routine physical examination    [facility] Weiler, Colleen M, DO    Office Visit          Future Appointments         Provider Department Appt Notes    In 1 month Weiler, Colleen M, DO [facility] Weight gain.  Please advise if bloodwork is needed in advance.                       amLODIPine 10 MG Oral Tab 90 tablet 0     Sig: Take 1 tablet (10 mg total) by mouth daily.       Hypertension Medications Protocol Failed - 9/23/2024 12:58 PM        Failed - CMP or BMP in past 12 months        Failed - Last BP reading less than 140/90     BP Readings from Last 1 Encounters:   06/05/23 (!) 164/91               Failed - EGFRCR or GFRNAA > 50     GFR Evaluation            Passed - In person appointment or virtual visit in the past 12 mos or appointment in next 3 mos     Recent Outpatient Visits              1 year ago [facility]    Nurse Only    1 year ago Essential hypertension    [facility] Weiler, Colleen M, DO    Office Visit    1 year ago Screening for colon cancer    [facility] Weiler, Colleen M, DO    Office Visit    1 year ago Primary hypertension    [facility] Weiler, Colleen M, DO    Office Visit    2 years ago Routine physical examination    [facility], DO    Office Visit          Future Appointments         Provider Department Appt Notes    In 1 month Weiler, Colleen M, DO [facility] Weight gain.  Please advise if bloodwork is needed in advance.                       Recent Outpatient Visits              1 year ago [facility] for colon [facility]    [facility]    Nurse Only    1 year ago Essential hypertension    [facility] Colleen M, DO    Office Visit    1 year ago Screening for colon cancer    [facility] Weiler, Colleen M, DO    Office Visit    1 year ago Primary hypertension    [facility] We[facility], Colleen M, DO    Office  Visit    2 years ago Routine physical examination    [facility] Weiler, Colleen M, DO    Office Visit          Future Appointments         Provider Department Appt Notes    In 1 month Weiler, Colleen M, DO [facility] Weight gain.  Please advise if bloodwork is needed in advance.

## 2024-09-23 NOTE — TELEPHONE ENCOUNTER
Rx sent for # 30 with 1 refill to get her through until next scheduled office visit. No further action needed by RN Triage.    Future Appointments   Date Time Provider Department Center   10/29/2024 11:15 AM [facility]iler[facility] M[facility]

## 2024-09-23 NOTE — TELEPHONE ENCOUNTER
From: Brii Phelps  To: Colleen Weiler  Sent: 9/23/2024 9:28 AM CDT  Subject: Follow up RX refill    Dr Weiler,    I believe a request for the following refills was routed to you. At this time I am out of Amloipine so am taking 2 5mg tablets a day.    amodipine 10 mg  losartan/hctz 100/25 mg    Please advise. Thanks,    Brianna Phelps  [phone]

## 2024-09-23 NOTE — TELEPHONE ENCOUNTER
Dr Weiler , patient has appointment 10/29/24. Would you need a sooner appointment, or okay to refill until then?     Future Appointments   Date Time Provider Department Center   10/29/2024 11:15 AM [facility]

## 2024-10-24 ENCOUNTER — PATIENT MESSAGE (OUTPATIENT)
Dept: FAMILY MEDICINE CLINIC | Facility: CLINIC | Age: 64
End: 2024-10-24

## 2024-10-25 NOTE — TELEPHONE ENCOUNTER
RN updated reported vital signs.     DR Weiler==see attached vital signs readings ===FYI       Future Appointments   Date Time Provider Department Center   10/29/2024 11:15 AM Weiler, Colleen M, DO [facility]

## 2024-10-29 ENCOUNTER — OFFICE VISIT (OUTPATIENT)
Dept: FAMILY MEDICINE CLINIC | Facility: CLINIC | Age: 64
End: 2024-10-29
Payer: MEDICARE

## 2024-10-29 VITALS
SYSTOLIC BLOOD PRESSURE: 136 MMHG | DIASTOLIC BLOOD PRESSURE: 80 MMHG | HEART RATE: 76 BPM | HEIGHT: 65.16 IN | TEMPERATURE: 98 F | WEIGHT: 162 LBS | RESPIRATION RATE: 16 BRPM | BODY MASS INDEX: 26.67 KG/M2

## 2024-10-29 DIAGNOSIS — Z12.11 SCREENING FOR COLON CANCER: ICD-10-CM

## 2024-10-29 DIAGNOSIS — E28.39 ESTROGEN DEFICIENCY: ICD-10-CM

## 2024-10-29 DIAGNOSIS — Z12.31 ENCOUNTER FOR SCREENING MAMMOGRAM FOR BREAST CANCER: ICD-10-CM

## 2024-10-29 DIAGNOSIS — Z00.00 WELCOME TO MEDICARE PREVENTIVE VISIT: Primary | ICD-10-CM

## 2024-10-29 DIAGNOSIS — I10 PRIMARY HYPERTENSION: ICD-10-CM

## 2024-10-29 DIAGNOSIS — Z12.4 SCREENING FOR CERVICAL CANCER: ICD-10-CM

## 2024-10-29 LAB
ALBUMIN SERPL-MCNC: 5 G/DL (ref 3.2–4.8)
ALBUMIN/GLOB SERPL: 1.7 {RATIO} (ref 1–2)
ALP LIVER SERPL-CCNC: 83 U/L
ALT SERPL-CCNC: 12 U/L
ANION GAP SERPL CALC-SCNC: 9 MMOL/L (ref 0–18)
AST SERPL-CCNC: 29 U/L (ref ?–34)
ATRIAL RATE: 73 BPM
BILIRUB SERPL-MCNC: 0.7 MG/DL (ref 0.2–1.1)
BUN BLD-MCNC: 12 MG/DL (ref 9–23)
BUN/CREAT SERPL: 14.8 (ref 10–20)
CALCIUM BLD-MCNC: 10.3 MG/DL (ref 8.7–10.4)
CHLORIDE SERPL-SCNC: 99 MMOL/L (ref 98–112)
CHOLEST SERPL-MCNC: 226 MG/DL (ref ?–200)
CO2 SERPL-SCNC: 27 MMOL/L (ref 21–32)
CREAT BLD-MCNC: 0.81 MG/DL
DEPRECATED RDW RBC AUTO: 45.6 FL (ref 35.1–46.3)
EGFRCR SERPLBLD CKD-EPI 2021: 81 ML/MIN/1.73M2 (ref 60–?)
ERYTHROCYTE [DISTWIDTH] IN BLOOD BY AUTOMATED COUNT: 13.2 % (ref 11–15)
FASTING PATIENT LIPID ANSWER: NO
FASTING STATUS PATIENT QL REPORTED: NO
GLOBULIN PLAS-MCNC: 2.9 G/DL (ref 2–3.5)
GLUCOSE BLD-MCNC: 93 MG/DL (ref 70–99)
HCT VFR BLD AUTO: 45.3 %
HDLC SERPL-MCNC: 69 MG/DL (ref 40–59)
HGB BLD-MCNC: 15 G/DL
LDLC SERPL CALC-MCNC: 135 MG/DL (ref ?–100)
MCH RBC QN AUTO: 31.2 PG (ref 26–34)
MCHC RBC AUTO-ENTMCNC: 33.1 G/DL (ref 31–37)
MCV RBC AUTO: 94.2 FL
NONHDLC SERPL-MCNC: 157 MG/DL (ref ?–130)
OSMOLALITY SERPL CALC.SUM OF ELEC: 279 MOSM/KG (ref 275–295)
P AXIS: 60 DEGREES
P-R INTERVAL: 196 MS
PLATELET # BLD AUTO: 293 10(3)UL (ref 150–450)
POTASSIUM SERPL-SCNC: 4.3 MMOL/L (ref 3.5–5.1)
PROT SERPL-MCNC: 7.9 G/DL (ref 5.7–8.2)
Q-T INTERVAL: 404 MS
QRS DURATION: 74 MS
QTC CALCULATION (BEZET): 445 MS
R AXIS: 47 DEGREES
RBC # BLD AUTO: 4.81 X10(6)UL
SODIUM SERPL-SCNC: 135 MMOL/L (ref 136–145)
T AXIS: 32 DEGREES
TRIGL SERPL-MCNC: 128 MG/DL (ref 30–149)
TSI SER-ACNC: 3.72 MIU/ML (ref 0.55–4.78)
VENTRICULAR RATE: 73 BPM
VLDLC SERPL CALC-MCNC: 23 MG/DL (ref 0–30)
WBC # BLD AUTO: 7.3 X10(3) UL (ref 4–11)

## 2024-10-29 PROCEDURE — G0008 ADMIN INFLUENZA VIRUS VAC: HCPCS | Performed by: FAMILY MEDICINE

## 2024-10-29 PROCEDURE — 90662 IIV NO PRSV INCREASED AG IM: CPT | Performed by: FAMILY MEDICINE

## 2024-10-29 PROCEDURE — G0009 ADMIN PNEUMOCOCCAL VACCINE: HCPCS | Performed by: FAMILY MEDICINE

## 2024-10-29 PROCEDURE — 90677 PCV20 VACCINE IM: CPT | Performed by: FAMILY MEDICINE

## 2024-10-29 PROCEDURE — 3008F BODY MASS INDEX DOCD: CPT | Performed by: FAMILY MEDICINE

## 2024-10-29 PROCEDURE — 3075F SYST BP GE 130 - 139MM HG: CPT | Performed by: FAMILY MEDICINE

## 2024-10-29 PROCEDURE — G0403 EKG FOR INITIAL PREVENT EXAM: HCPCS | Performed by: FAMILY MEDICINE

## 2024-10-29 PROCEDURE — 96160 PT-FOCUSED HLTH RISK ASSMT: CPT | Performed by: FAMILY MEDICINE

## 2024-10-29 PROCEDURE — G0402 INITIAL PREVENTIVE EXAM: HCPCS | Performed by: FAMILY MEDICINE

## 2024-10-29 PROCEDURE — 3079F DIAST BP 80-89 MM HG: CPT | Performed by: FAMILY MEDICINE

## 2024-10-29 NOTE — PROGRESS NOTES
Subjective:   Brii Phelps is a 64 year old female who presents for a MA AHA (Medicare Advantage Annual Health Assessment) and Medicare Initial Preventative Physical Exam (Welcome to Medicare- < 12 months on Medicare) and scheduled follow up of multiple significant but stable problems.         64 yr old female who presents for physical. Has 5 children.  Has 13 grandchildren. Partner passed suddenly in Jan 2022. Retired.  Babysitting frequently. Has gained weight.  Less active than she used to be. Eating healthy.     BP controlled. Similar to home readings. Taking medications.     Needs mammogram, DEXA scan and colonoscopy.     Quit smoking at age 19.     Last pap was 2018.  Never had abnormal. Denies vaginal bleeding.     Normal hearing.     Needs eye exam.     History/Other:   Fall Risk Assessment:   She has been screened for Falls and is low risk.      Cognitive Assessment:   She had a completely normal cognitive assessment - see flowsheet entries     Functional Ability/Status:   Brii Phelps has a completely normal functional assessment. See flowsheet for details.        Depression Screening (PHQ):  PHQ-2 SCORE: 0  , done 10/29/2024             Advanced Directives:   She does have a Living Will but we do NOT have it on file in Epic.    She does have a POA but we do NOT have it on file in Epic.    Discussed Advance Care Planning with patient (and family/surrogate if present). Standard forms made available to patient in After Visit Summary.      Patient Active Problem List   Diagnosis    Acquired hypothyroidism    Osteopenia    Vitamin D deficiency    Colon adenomas    Essential hypertension     Allergies:  She has No Known Allergies.    Current Medications:  Outpatient Medications Marked as Taking for the 10/29/24 encounter (Office Visit) with Weiler, Colleen M, DO   Medication Sig    amLODIPine 10 MG Oral Tab Take 1 tablet (10 mg total) by mouth daily.    losartan-hydroCHLOROthiazide 100-25 MG  Oral Tab Take 1 tablet by mouth daily.    levothyroxine 100 MCG Oral Tab Take 1 tablet (100 mcg total) by mouth daily.    cholecalciferol 25 MCG (1000 UT) Oral Tab Take 1 tablet (1,000 Units total) by mouth daily.       Medical History:  She  has a past medical history of Chronic airway obstruction, not elsewhere classified, Essential hypertension (2018), HYPOTHYROIDISM, Osteopenia (2011), Pneumothorax (), and Unspecified hypothyroidism (2011).  Surgical History:  She  has a past surgical history that includes colonoscopy,remv lesn,snare (2014); colonoscpy, flexible, proximal to splenic flexure; w/directed submucosa injection(s), any substance (2014); colonoscopy,biopsy (2014); benign biopsy left (87); knee surgery (Left); tonsillectomy; other surgical history (); and other surgical history ().   Family History:  Her family history includes Arthritis in her father; Breast Cancer (age of onset: 71) in her mother; Cancer in her maternal grandmother and mother; Diabetes in her father and paternal grandfather; Heart Disease in her father; Heart Disorder in her father; Hypertension in her brother and sister.  Social History:  She  reports that she quit smoking about 32 years ago. Her smoking use included cigarettes. She started smoking about 47 years ago. She has never been exposed to tobacco smoke. She has never used smokeless tobacco. She reports current alcohol use of about 7.0 standard drinks of alcohol per week. She reports that she does not use drugs.    Tobacco:  She smoked tobacco in the past but quit greater than 12 months ago.  Social History     Tobacco Use   Smoking Status Former    Current packs/day: 0.00    Types: Cigarettes    Start date: 1977    Quit date: 1992    Years since quittin.5    Passive exposure: Never   Smokeless Tobacco Never   Tobacco Comments    quit           CAGE Alcohol Screen:   CAGE screening score of 0 on 10/24/2024, showing  low risk of alcohol abuse.      Patient Care Team:  Weiler, Colleen M, DO as PCP - General (Family Medicine)    Review of Systems  ROS:   Allergic/Immuno:  Negative for environmental allergies and food allergies  Cardiovascular:  Negative for chest pain and irregular heartbeat/palpitations  Constitutional:  Negative for decreased activity, fever, irritability and lethargy  Endocrine:  Negative for abnormal sleep patterns, increased activity, polydipsia and polyphagia  ENMT:  Negative for ear drainage, hearing loss and nasal drainage  Eyes:  Negative for eye discharge and vision loss  Gastrointestinal:  Negative for abdominal pain, constipation, decreased appetite, diarrhea and vomiting  Genitourinary:  Negative for dysuria and hematuria  Hema/Lymph:  Negative for easy bleeding and easy bruising  Integumentary:  Negative for pruritus and rash  Musculoskeletal:  Negative for bone/joint symptoms  Neurological:  Negative for gait disturbance  Psychiatric:  Negative for inappropriate interaction and psychiatric symptoms      Objective:   Physical Exam       /80   Pulse 76   Temp 97.6 °F (36.4 °C) (Temporal)   Resp 16   Ht 5' 5.16\" (1.655 m)   Wt 162 lb (73.5 kg)   BMI 26.83 kg/m²  Estimated body mass index is 26.83 kg/m² as calculated from the following:    Height as of this encounter: 5' 5.16\" (1.655 m).    Weight as of this encounter: 162 lb (73.5 kg).    Medicare Hearing Assessment:   Hearing Screening    Time taken: 10/29/2024 12:00 PM  Entry User: Weiler, Colleen M, DO  Screening Method: Whisper Test  Whisper Test Result: Pass         Visual Acuity:   Right Eye Visual Acuity: Corrected Right Eye Chart Acuity: 20/25   Left Eye Visual Acuity: Corrected Left Eye Chart Acuity: 20/25   Both Eyes Visual Acuity: Corrected Both Eyes Chart Acuity: 20/25   Able To Tolerate Visual Acuity: Yes    Gen:  Well-nourished.  No distress.  HEENT: Conjunctive clear.  Kyler ear canals clear.  Kyler TMs intact with good landmarks  noted.  Nares patent.  Oral mucous membrane moist.  Normal lips, teeth, and gums.  Oropharynx normal.  Neck supple.  Good ROM.  No LAD.  Thyroid normal.  CV:  Regular rate and rhythm; no murmurs  Lungs:  Clear to ausculation; good aeration               No wheezes, rales or rhonchi  Abd: soft, non-tender, non-distended          Normal bowel sounds; no masses          No hepatosplenomegaly  Breasts:  Normal appearance bilateral.  No masses or lesions noted.  Normal nipples bilateral.  No nipple discharge noted.  Normal lymph nodes.  :  External genitalia normal.  Urethral meatus normal.  No lesions noted.  Vaginal mucose normal.  Normal cervix.  No uterine or ovarian masses noted on exam.  Extremities: No cyanosis, clubbing, edema.  Pedal pulses 2+ ange.  MSK:  No abnormalities.  Skin: Warm/dry/intact.  No abnormal moles/lesions noted.  No rashes.  Psych: Orientated to person, place, and time.  Behavior and affect appropriate for age.        Assessment & Plan:   Brii Phelps is a 64 year old female who presents for a Medicare Assessment.     1. Welcome to Medicare preventive visit (Primary)    Encouraged exercise.  Labs done. Flu and pneumonia given.  EKG showed normal sinus rhythm.  -     CBC, Platelet; No Differential; Future; Expected date: 10/29/2024  -     Comp Metabolic Panel (14); Future; Expected date: 10/29/2024  -     Lipid Panel; Future; Expected date: 10/29/2024  -     TSH W Reflex To Free T4; Future; Expected date: 10/29/2024  -     EKG In-Office [[id number]]  -     CBC, Platelet; No Differential  -     Comp Metabolic Panel (14)  -     Lipid Panel  -     TSH W Reflex To Free T4    2. Screening for colon cancer    Refer to GI  -     Gastro Referral - [facility] ([place])    3. Estrogen deficiency    Check DEX scan.   -     XR DEXA BONE DENSITOMETRY (CPT=77080); Future; Expected date: 10/29/2024    4. Encounter for screening mammogram for breast cancer     Mammogram ordered.  Will call with results.    -      [facility] ERLINDA 2D+3D SCREENING BILAT (CPT=77067/29722); Future; Expected date: 10/29/2024    5. Screening for cervical cancer    Pap and HPV done.  Will call with results.   -     ThinPrep PAP with HPV Reflex Request B; Future; Expected date: 10/29/2024  -     ThinPrep PAP with HPV Reflex Request B    6. Primary hypertension    Controlled.  CPM    Other orders  -     Prevnar 20 (PCV20) [08305]  -     Cancel: Fluzone trivalent vaccine, PF 0.5mL, 6mo+ (74851)  -     High Dose Fluzone trivalent influenza, 65yrs+ PFS (67449)    The patient indicates understanding of these issues and agrees to the plan.  Reinforced healthy diet, lifestyle, and exercise.      Return in 6 months (on 4/29/2025).     Colleen Weiler, DO, 10/29/2024     Supplementary Documentation:   General Health:  In the past six months, have you lost more than 10 pounds without trying?: (Patient-Rptd) 2 - No  Has your appetite been poor?: (Patient-Rptd) No  Type of Diet: (Patient-Rptd) Low Carb  How does the patient maintain a good energy level?: (Patient-Rptd) Daily Walks  How would you describe your daily physical activity?: (Patient-Rptd) Light  How would you describe your current health state?: (Patient-Rptd) Fair  How do you maintain positive mental well-being?: (Patient-Rptd) Social Interaction;Visiting Friends;Visiting Family  On a scale of 0 to 10, with 0 being no pain and 10 being severe pain, what is your pain level?: (Patient-Rptd) 0 - (None)  In the past six months, have you experienced urine leakage?: (Patient-Rptd) 0-No  At any time do you feel concerned for the safety/well-being of yourself and/or your children, in your home or elsewhere?: (Patient-Rptd) No  Have you had any immunizations at another office such as Influenza, Hepatitis B, Tetanus, or Pneumococcal?: (Patient-Rptd) No    Health Maintenance   Topic Date Due    Zoster Vaccines (1 of 2) Never done    Colorectal Cancer Screening  02/05/2015    Pap Smear  08/29/2023    MA Annual  Health Assessment  Never done    DEXA Scan  01/15/2024    Mammogram  06/04/2024    COVID-19 Vaccine (3 - 2023-24 season) 09/01/2024    Influenza Vaccine  Completed    Annual Depression Screening  Completed    Fall Risk Screening (Annual)  Completed    Pneumococcal Vaccine: 65+ Years  Completed

## 2024-10-31 ENCOUNTER — PATIENT MESSAGE (OUTPATIENT)
Dept: FAMILY MEDICINE CLINIC | Facility: CLINIC | Age: 64
End: 2024-10-31

## 2024-10-31 DIAGNOSIS — E78.5 HYPERLIPIDEMIA, UNSPECIFIED HYPERLIPIDEMIA TYPE: Primary | ICD-10-CM

## 2024-10-31 LAB
HPV E6+E7 MRNA CVX QL NAA+PROBE: NEGATIVE
LAST PAP RESULT: NORMAL
PAP HISTORY (OTHER THAN LAST PAP): NORMAL

## 2024-11-06 ENCOUNTER — NURSE ONLY (OUTPATIENT)
Facility: CLINIC | Age: 64
End: 2024-11-06

## 2024-11-06 ENCOUNTER — HOSPITAL ENCOUNTER (OUTPATIENT)
Dept: BONE DENSITY | Age: 64
Discharge: HOME | End: 2024-11-06
Attending: FAMILY MEDICINE
Payer: MEDICARE

## 2024-11-06 DIAGNOSIS — Z86.0100 HISTORY OF COLON POLYPS: ICD-10-CM

## 2024-11-06 DIAGNOSIS — E28.39 ESTROGEN DEFICIENCY: ICD-10-CM

## 2024-11-06 DIAGNOSIS — Z12.11 SCREENING FOR COLON CANCER: Primary | ICD-10-CM

## 2024-11-06 PROCEDURE — 77080 DXA BONE DENSITY AXIAL: CPT | Performed by: FAMILY MEDICINE

## 2024-11-06 NOTE — PROGRESS NOTES
GI Staff:  TCS Colon Screening Orders    Please schedule: Colonoscopy 01516 with MAC OR IV (if appropriate)    Please send split dose Golytely bowel prep     Diagnosis: Colon Screening Z12.11 / History of Colon polyps  Z86.010   Medication adjustments: None  Day before procedure, hold:None  Day of procedure, hold: None    >>>Please inform patient if new medications are started after scheduling procedure they need to call clinic to notify us.

## 2024-11-06 NOTE — PROGRESS NOTES
Called patient for TCS/positive FIT.   Please advise on colonoscopy and bowel prep orders.   Medications, pharmacy, and allergies reviewed.     Age 45-76 y/o:   › MD preference: None  › Insurance:  Medicare Advantage [employer] PPO  › Last PCP visit: 10/29/2024  › Last CBC: 10/29/2024  › Date of positive FIT (if applicable): N/A  › H/W/BMI: 5'5.16'/ 162.0 Lbs/26.83 BMI    Special comments/notes: Patient is unsure when last Colonoscopy was completed.  Telephone Colon Screening Questionnaire Yes No   Are you currently experiencing any new/abnormal GI symptoms? [] [x]   If yes, explain:     Rectal bleeding? [] [x]   Black stool? [] [x]   Dysphagia or food \"feeling stuck\" when eating? [] [x]   Intractable vomiting? [] [x]   Unexplained weight loss? [] [x]   First colonoscopy? [x] []   Family history of colon cancer? [] [x]   Any issues with anesthesia? [] [x]   If yes, explain:      Any recent complaints related to chest pain &/or shortness of breath?  [] [x]   Were you referred to a cardiologist?  [] [x]   If yes, explain:      History of  respiratory issues/oxygen/HILDA/COPD: [] [x]   CPAP/BiPAP:     History of devices (pacemaker/defibrillator) [] [x]   History of heart attack &/or stroke?  [] [x]   If yes, in the last 12 months? Stent placement?  [] [x]     Medications Yes  No   Anticoagulants (except Aspirin):  [] [x]   Diabetic Medication [] [x]   Weight loss meds (phentermine/vyvanse/saxsenda/etc): [] [x]   Iron/herbal/multivitamin supplement: [] [x]   Usage of marijuana, CBD &/or vape products: [] [x]

## 2024-11-06 NOTE — PROGRESS NOTES
Scheduled for:  Colonoscopy 17276  Provider Name:    Date:  2/6/2025  Location:  [facility]  Sedation: MAC  Time:  (Patient is aware that Endoscopy Desk will call the day before to confirm arrival time)  Prep:  Golytely  Meds/Allergies Reconciled?:  Physician reviewed  Diagnosis with codes: Screening for Colon Cancer Z12.11, History of Colon Polyps Z86.010   Was patient informed to call insurance with codes (Y/N): Yes   Referral sent?:  Referral was sent at the time of electronic surgical scheduling.  EM or [facility] notified?:   I sent an electronic request to Endo Scheduling and received a confirmation today.  Medication Orders: Patient is aware to NOT take iron pills, herbal meds and diet supplements for 7 days before exam. Also to NOT take any form of alcohol, recreational drugs and any forms of ED meds 24-72 hours before exam.    Misc Orders:  N/A     Further instructions given by staff: I discussed the prep instructions with the patient which she verbally understood and is aware that I will send prep instructions today via [employer].

## 2024-11-07 RX ORDER — ATORVASTATIN CALCIUM 10 MG/1
10 TABLET, FILM COATED ORAL NIGHTLY
Qty: 90 TABLET | Refills: 1 | Status: SHIPPED | OUTPATIENT
Start: 2024-11-07

## 2024-11-16 ENCOUNTER — HOSPITAL ENCOUNTER (OUTPATIENT)
Dept: MAMMOGRAPHY | Age: 64
Discharge: HOME | End: 2024-11-16
Attending: FAMILY MEDICINE
Payer: MEDICARE

## 2024-11-16 DIAGNOSIS — Z12.31 ENCOUNTER FOR SCREENING MAMMOGRAM FOR BREAST CANCER: ICD-10-CM

## 2024-11-16 PROCEDURE — 77063 BREAST TOMOSYNTHESIS BI: CPT | Performed by: FAMILY MEDICINE

## 2024-11-16 PROCEDURE — 77067 SCR MAMMO BI INCL CAD: CPT | Performed by: FAMILY MEDICINE

## 2024-11-18 DIAGNOSIS — R92.333 HETEROGENEOUSLY DENSE TISSUE OF BOTH BREASTS ON MAMMOGRAPHY: Primary | ICD-10-CM

## 2024-12-18 DIAGNOSIS — I10 ESSENTIAL HYPERTENSION: ICD-10-CM

## 2024-12-18 DIAGNOSIS — E03.9 HYPOTHYROIDISM, UNSPECIFIED TYPE: ICD-10-CM

## 2024-12-23 RX ORDER — LEVOTHYROXINE SODIUM 100 UG/1
100 TABLET ORAL DAILY
Qty: 90 TABLET | Refills: 3 | Status: SHIPPED | OUTPATIENT
Start: 2024-12-23

## 2024-12-23 NOTE — TELEPHONE ENCOUNTER
Refill Passed Per Protocol    Requested Prescriptions   Pending Prescriptions Disp Refills    LEVOTHYROXINE 100 MCG Oral Tab [Pharmacy Med Name: LEVOTHYROXINE 0.100MG (100MCG) TAB] 90 tablet 0     Sig: TAKE 1 TABLET(100 MCG) BY MOUTH DAILY       Thyroid Medication Protocol Passed - 12/23/2024  3:43 PM        Passed - TSH in past 12 months        Passed - Last TSH value is normal     Lab Results   Component Value Date    TSH 3.720 10/29/2024                 Passed - In person appointment or virtual visit in the past 12 mos or appointment in next 3 mos     Recent Outpatient Visits              1 month ago Screening for colon cancer    [facility], [place]    Nurse Only    1 month ago Welcome to Medicare preventive visit    [facility] Col[facility] M, [facility]    Office Visit    1 year ago [facility]    1 year ago Essential hypertension    [facility] [place] Weiler, Colleen M, DO    Office Visit    1 year ago Screening for colon cancer    [facility] [place] Weiler, Colleen M, DO    Office Visit          Future Appointments         Provider Department Appt Notes    In 1 month SELMA, PROCEDURE [facility] [place] Colonoscopy w/MAC @[facility]                         Future Appointments         Provider Department Appt Notes    In 1 month SELMA, PROCEDURE [facility] [place] Colonoscopy w/MAC @[facility]          Recent Outpatient Visits              1 month ago [facility]    1 month ago Welcome to Medicare preventive visit    [facility] [place] Weiler, Colleen M, DO    Office Visit    1 year ago Screening for colon cancer     [facility], [facility], [place]    Nurse Only    1 year ago Essential hypertension    [facility], [facility] Weiler, Colleen M, DO    Office Visit    1 year ago Screening for colon cancer    [facility], [facility], [place] Weiler, Colleen M, DO    Office Visit

## 2024-12-24 RX ORDER — AMLODIPINE BESYLATE 10 MG/1
10 TABLET ORAL DAILY
Qty: 90 TABLET | Refills: 3 | Status: SHIPPED | OUTPATIENT
Start: 2024-12-24

## 2024-12-24 RX ORDER — LOSARTAN POTASSIUM AND HYDROCHLOROTHIAZIDE 25; 100 MG/1; MG/1
1 TABLET ORAL DAILY
Qty: 90 TABLET | Refills: 3 | Status: SHIPPED | OUTPATIENT
Start: 2024-12-24

## 2024-12-24 NOTE — TELEPHONE ENCOUNTER
Refill passed per [facility] protocol.  Requested Prescriptions   Pending Prescriptions Disp Refills    AMLODIPINE 10 MG Oral Tab [Pharmacy Med Name: AMLODIPINE BESYLATE 10MG TABLETS] 90 tablet 0     Sig: TAKE 1 TABLET(10 MG) BY MOUTH DAILY       Hypertension Medications Protocol Passed - 12/24/2024 10:00 AM        Passed - CMP or BMP in past 12 months        Passed - Last BP reading less than 140/90     BP Readings from Last 1 Encounters:   10/29/24 136/80               Passed - In person appointment or virtual visit in the past 12 mos or appointment in next 3 mos     Recent Outpatient Visits              1 month ago Screening for colon cancer    [facility], [place]    Nurse Only    1 month ago Welcome to Medicare preventive visit    [facility] [place] Weiler, Colleen M, DO    Office Visit    1 year ago Screening for colon cancer    [facility]    Nurse Only    1 year ago Essential hypertension    [facility], [place] Weiler, Colleen M, DO    Office Visit    1 year ago Screening for colon cancer    [facility], [place] Weiler, Colleen M, DO    Office Visit          Future Appointments         Provider Department Appt Notes    In 1 month SELMA, PROCEDURE [facility] Colonoscopy w/MAC @[facility]                    Passed - EGFRCR or GFRNAA > 50     GFR Evaluation  EGFRCR: 81 , resulted on 10/29/2024            LOSARTAN-HYDROCHLOROTHIAZIDE 100-25 MG Oral Tab [Pharmacy Med Name: LOSARTAN/HCTZ 100/25MG TABLETS] 90 tablet 0     Sig: Take 1 tablet by mouth daily.       Hypertension Medications Protocol Passed - 12/24/2024 10:00 AM        Passed - CMP or BMP in past 12 months        Passed - Last BP reading less than 140/90     BP Readings from Last 1 Encounters:   10/29/24 136/80               Passed - In person  appointment or virtual visit in the past 12 mos or appointment in next 3 mos     Recent Outpatient Visits              1 month ago Screening for colon cancer    [facility] [place]    Nurse Only    1 month ago Welcome to Medicare preventive visit    [facility] [place] Weiler, Colleen M, DO    Office Visit    1 year ago [facility]    1 year ago [facility]    Office Visit    1 year ago Screening for colon cancer    [facility] Col[facility], DO    Office Visit          Future Appointments         Provider Department Appt Notes    In 1 month SELMA, PROCEDURE [facility], [place] Colonoscopy w/MAC @[facility]                    Passed - EGFRCR or GFRNAA > 50     GFR Evaluation  EGFRCR: 81 , resulted on 10/29/2024             Future Appointments         Provider Department Appt Notes    In 1 month SELMA, PROCEDURE [facility], [place] Colonoscopy w/MAC @[facility]          Recent Outpatient Visits              1 month ago Screening for colon cancer    [facility] [place]    Nurse Only    1 month ago Welcome to Medicare preventive visit    [facility] Col[facility] M, DO    Office Visit    1 year ago [facility]    1 year ago Essential hypertension    [facility] Colleen M, DO    Office Visit    1 year ago Screening for colon cancer    [facility] Col[facility] M, DO    Office Visit

## 2025-01-03 ENCOUNTER — TELEPHONE (OUTPATIENT)
Facility: CLINIC | Age: 65
End: 2025-01-03

## 2025-04-03 ENCOUNTER — ANESTHESIA EVENT (OUTPATIENT)
Dept: ENDOSCOPY | Age: 65
End: 2025-04-03
Payer: MEDICARE

## 2025-04-03 ENCOUNTER — ANESTHESIA (OUTPATIENT)
Dept: ENDOSCOPY | Age: 65
End: 2025-04-03
Payer: MEDICARE

## 2025-04-03 ENCOUNTER — TELEPHONE (OUTPATIENT)
Facility: CLINIC | Age: 65
End: 2025-04-03

## 2025-04-03 ENCOUNTER — HOSPITAL ENCOUNTER (OUTPATIENT)
Age: 65
Setting detail: HOSPITAL OUTPATIENT SURGERY
Discharge: HOME | End: 2025-04-03
Attending: INTERNAL MEDICINE | Admitting: INTERNAL MEDICINE
Payer: MEDICARE

## 2025-04-03 VITALS
HEART RATE: 70 BPM | WEIGHT: 162 LBS | BODY MASS INDEX: 26.35 KG/M2 | HEIGHT: 65.75 IN | RESPIRATION RATE: 14 BRPM | OXYGEN SATURATION: 99 % | DIASTOLIC BLOOD PRESSURE: 68 MMHG | SYSTOLIC BLOOD PRESSURE: 107 MMHG

## 2025-04-03 DIAGNOSIS — Z86.0100 HISTORY OF COLON POLYPS: ICD-10-CM

## 2025-04-03 DIAGNOSIS — Z12.11 SCREENING FOR COLON CANCER: ICD-10-CM

## 2025-04-03 PROBLEM — K64.9 HEMORRHOIDS: Status: ACTIVE | Noted: 2025-04-03

## 2025-04-03 PROBLEM — K55.20 AVM (ARTERIOVENOUS MALFORMATION) OF COLON: Status: ACTIVE | Noted: 2025-04-03

## 2025-04-03 PROBLEM — K63.5 POLYP OF COLON: Status: ACTIVE | Noted: 2025-04-03

## 2025-04-03 PROBLEM — K57.30 DIVERTICULOSIS LARGE INTESTINE W/O PERFORATION OR ABSCESS W/O BLEEDING: Status: ACTIVE | Noted: 2025-04-03

## 2025-04-03 PROCEDURE — 45385 COLONOSCOPY W/LESION REMOVAL: CPT | Performed by: INTERNAL MEDICINE

## 2025-04-03 PROCEDURE — 99070 SPECIAL SUPPLIES PHYS/QHP: CPT | Performed by: INTERNAL MEDICINE

## 2025-04-03 PROCEDURE — 45380 COLONOSCOPY AND BIOPSY: CPT | Performed by: INTERNAL MEDICINE

## 2025-04-03 RX ORDER — SODIUM CHLORIDE, SODIUM LACTATE, POTASSIUM CHLORIDE, CALCIUM CHLORIDE 600; 310; 30; 20 MG/100ML; MG/100ML; MG/100ML; MG/100ML
INJECTION, SOLUTION INTRAVENOUS CONTINUOUS
Status: DISCONTINUED | OUTPATIENT
Start: 2025-04-03 | End: 2025-04-03

## 2025-04-03 RX ORDER — NALOXONE HYDROCHLORIDE 0.4 MG/ML
0.08 INJECTION, SOLUTION INTRAMUSCULAR; INTRAVENOUS; SUBCUTANEOUS ONCE AS NEEDED
Status: DISCONTINUED | OUTPATIENT
Start: 2025-04-03 | End: 2025-04-03

## 2025-04-03 RX ORDER — SODIUM CHLORIDE, SODIUM LACTATE, POTASSIUM CHLORIDE, CALCIUM CHLORIDE 600; 310; 30; 20 MG/100ML; MG/100ML; MG/100ML; MG/100ML
INJECTION, SOLUTION INTRAVENOUS CONTINUOUS PRN
Status: DISCONTINUED | OUTPATIENT
Start: 2025-04-03 | End: 2025-04-03 | Stop reason: SURG

## 2025-04-03 RX ORDER — LIDOCAINE HYDROCHLORIDE 10 MG/ML
INJECTION, SOLUTION EPIDURAL; INFILTRATION; INTRACAUDAL; PERINEURAL AS NEEDED
Status: DISCONTINUED | OUTPATIENT
Start: 2025-04-03 | End: 2025-04-03 | Stop reason: SURG

## 2025-04-03 RX ADMIN — LIDOCAINE HYDROCHLORIDE 40 MG: 10 INJECTION, SOLUTION EPIDURAL; INFILTRATION; INTRACAUDAL; PERINEURAL at 11:45:00

## 2025-04-03 RX ADMIN — SODIUM CHLORIDE, SODIUM LACTATE, POTASSIUM CHLORIDE, CALCIUM CHLORIDE: 600; 310; 30; 20 INJECTION, SOLUTION INTRAVENOUS at 11:20:00

## 2025-04-03 NOTE — DISCHARGE INSTRUCTIONS
Home Care Instructions for Colonoscopy with Sedation    Diet:  - Resume your regular diet as tolerated unless otherwise instructed.  - Start with light meals to minimize bloating.  - Do not drink alcohol today.    Medication:  - If you have questions about resuming your normal medications, please contact your Primary Care Physician.    Activities:  - Take it easy today. Do not return to work today.  - Do not drive today.  - Do not operate any machinery today (including kitchen equipment).  -   Do not make any critical decisions or sign any paperwork.  - Do not exercise today.    Colonoscopy:  - You may notice some rectal \"spotting\" (a little blood on the toilet tissue) for a day or two after the exam. This is normal.  - If you experience any rectal bleeding (not spotting), persistent tenderness or sharp severe abdominal pains, oral temperature over 100 degrees Fahrenheit, light-headedness or dizziness, or any other problems, contact your doctor.      **If unable to reach your doctor, please go to the [facility] Emergency Room**    - Your referring physician will receive a full report of your examination.  - If you do not hear from your doctor's office within two weeks of your biopsy, please call them for your results.    You may be able to see your laboratory results in [employer] between 4 and 7 business days.  In some cases, your physician may not have viewed the results before they are released to [email or web address].  If you have questions regarding your results contact the physician who ordered the test/exam by phone or via [email or web address] by choosing \"Ask a Medical Question.\"

## 2025-04-03 NOTE — ANESTHESIA POSTPROCEDURE EVALUATION
Patient: Brii Phelps    Procedure Summary       Date: 04/03/25 Room / Location: [facility] ENDOSCOPY 01 / [facility]    Anesthesia Start: 1144 Anesthesia Stop: 1218    Procedure: COLONOSCOPY with polypectomy Diagnosis:       Screening for colon cancer      History of colon polyps      (Polyps,Hemorrhoids, Diverticulosis, AVM, Previous Tattoo)    Surgeons: Bhumika Marks MD Anesthesiologist: Hayder Miranda MD    Anesthesia Type: MAC ASA Status: 3            Anesthesia Type: MAC    Vitals Value Taken Time   /68 04/03/25 1243   Temp  04/03/25 1305   Pulse 68 04/03/25 1246   Resp 10 04/03/25 1246   SpO2 98 % 04/03/25 1246   Vitals shown include unfiled device data.    [facility] AN Post Evaluation:   Patient Evaluated in PACU  Patient Participation: complete - patient participated  Level of Consciousness: awake and alert  Pain Score: 0  Pain Management: adequate  Airway Patency:patent  Dental exam unchanged from preop  Yes    Nausea/Vomiting: none  Cardiovascular Status: acceptable  Respiratory Status: acceptable  Postoperative Hydration acceptable      Hayder Miranda MD  4/3/2025 1:05 PM

## 2025-04-03 NOTE — H&P
Pre Procedure History & Physical Examination    Patient Name: Brii Phelps  MRN: [id number]  CSN: [id number]  YOB: 1960    Diagnosis: screening colonoscopy, history of polyps    Prescriptions Prior to Admission[1]  No current facility-administered medications for this encounter.       Allergies: Allergies[2]    Past Medical History:    Chronic airway obstruction, not elsewhere classified    Disorder of thyroid    Essential hypertension    High blood pressure    High cholesterol    HYPOTHYROIDISM    Osteopenia    Pneumothorax    spontaneous    Unspecified hypothyroidism     Past Surgical History:   Procedure Laterality Date    Benign biopsy left  87    Colonoscopy,biopsy  1/30/2014    Procedure: COLONOSCOPY, POSSIBLE BIOPSY, POSSIBLE POLYPECTOMY 41224;  Surgeon: Nile Romeo MD;  Location: [facility]    Colonoscopy,remv lesn,snare  1/30/2014    Procedure: COLONOSCOPY, POSSIBLE BIOPSY, POSSIBLE POLYPECTOMY 58589;  Surgeon: Nile Romeo MD;  Location: [facility]    Colonoscpy, flexible, proximal to splenic flexure; w/directed submucosa injection(s), any substance  1/30/2014    Procedure: COLONOSCOPY, POSSIBLE BIOPSY, POSSIBLE POLYPECTOMY 45665;  Surgeon: Nile Romeo MD;  Location: [facility]    Knee surgery Left     ACL    Other surgical history  1998    endometrial ablation    Other surgical history  1980    lung surgery    Tonsillectomy       Family History   Problem Relation Age of Onset    Breast Cancer Mother 71        dx 2009 - fully recovered    Cancer Mother         breast    Arthritis Father     Diabetes Father     Heart Disease Father     Heart Disorder Father     Cancer Maternal Grandmother         lung- smoker    Diabetes Paternal Grandfather     Hypertension Sister     Hypertension Brother      Social History     Tobacco Use    Smoking status: Former     Current packs/day: 0.00     Types: Cigarettes     Start date: 4/14/1977     Quit date:  1992     Years since quittin.9     Passive exposure: Never    Smokeless tobacco: Never    Tobacco comments:     quit    Substance Use Topics    Alcohol use: Yes     Alcohol/week: 7.0 standard drinks of alcohol     Types: 7 Standard drinks or equivalent per week     Comment: 2-4/week         ROS:   CONSTITUTIONAL:  negative for fevers, rigors  EYES:  negative for diplopia   RESPIRATORY:  negative for severe shortness of breath  CARDIOVASCULAR:  negative for crushing sub-sternal chest pain  GASTROINTESTINAL:  see HPI  GENITOURINARY:  negative for dysuria or gross hematuria  INTEGUMENT/BREAST:  SKIN:  negative for jaundice   ALLERGIC/IMMUNOLOGIC:  negative for hay fever  ENDOCRINE:  negative for cold intolerance and heat intolerance  MUSCULOSKELETAL:  negative for joint effusion/severe erythema  BEHAVIOR/PSYCH:  negative for psychotic behavior      PHYSICAL EXAM:   Ht 5' 5.75\" (1.67 m)   Wt 162 lb (73.5 kg)   BMI 26.35 kg/m²       Gen: Patient appears comfortable and in no acute discomfort  HEENT: the sclera appears anicteric, oropharynx clear, mucus membranes appear moist  CV: regular rate and rhythm, the extremities are warm and well perfused   Lung: Moves air well; No labored breathing  Abdomen: soft, non-tender exam in all quadrants without rigidity or guarding, non-distended, no abnormal bowel sounds noted, no masses are palpated  Skin: No jaundice  Ext: no cyanosis, clubbing or edema is evident.   Neuro: Alert and interactive, and gross movements of extremities normal    I have discussed the risks and benefits and alternatives of the procedure with the patient/family.  They understand and agree to proceed with plan of care.   I have reviewed recent H&P/clinic notes  Bhumika Marks MD  [facility] - Gastroenterology  4/3/2025  11:44 AM         [1]   Medications Prior to Admission   Medication Sig Dispense Refill Last Dose/Taking    amLODIPine 10 MG Oral Tab Take 1 tablet (10 mg total) by mouth  daily. 90 tablet 3 4/3/2025    losartan-hydroCHLOROthiazide 100-25 MG Oral Tab Take 1 tablet by mouth daily. 90 tablet 3 4/3/2025    levothyroxine 100 MCG Oral Tab Take 1 tablet (100 mcg total) by mouth daily. 90 tablet 3 4/3/2025    atorvastatin 10 MG Oral Tab Take 1 tablet (10 mg total) by mouth nightly. 90 tablet 1 4/2/2025    cholecalciferol 25 MCG (1000 UT) Oral Tab Take 1 tablet (1,000 Units total) by mouth daily. With calcium   4/2/2025   [2] No Known Allergies

## 2025-04-03 NOTE — TELEPHONE ENCOUNTER
Patient states she is running about 10 minutes late for the procedure and wants to advise this please note

## 2025-04-03 NOTE — OPERATIVE REPORT
COLONOSCOPY REPORT    Brii Phelps     1/15/1960 Age 65 year old   PCP Colleen Weiler, DO Endoscopist Bhumika Marks MD     Date of procedure: 25    Procedure: Colonoscopy w/cold biopsy and cold snare polypectomy    Pre-operative diagnosis: screening colonoscopy, history of polyp    Post-operative diagnosis: colon polyps, diverticulosis, R colon AVMs, hemorrhoids    Medications: MAC sedation    Withdrawal time: 14 minutes    Procedure:  Informed consent was obtained from the patient after the risks of the procedure were discussed, including but not limited to bleeding, perforation, aspiration, infection, or possibility of a missed lesion. After discussions of the risks/benefits and alternatives to this procedure, as well as the planned sedation, the patient was placed in the left lateral decubitus position and begun on continuous blood pressure pulse oximetry and EKG monitoring and this was maintained throughout the procedure. Once an adequate level of sedation was obtained a digital rectal exam was completed. Then the lubricated tip of the [id number] diagnostic video colonoscope was inserted and advanced without difficulty to the cecum using the CO2 insufflation technique. The cecum was identified by localizing the trifold, the appendix and the ileocecal valve. Withdrawal was begun with thorough washing and careful examination of the colonic walls and folds. A routine second examination of the cecum/ascending colon was performed. Photodocumentation was obtained. The bowel prep was good. Views of the colon were good with washing. I then carefully withdrew the instrument from the patient who tolerated the procedure well.     Complications: none.    Findings:   1. 8 polyp(s) noted as follows:      A. 4-5 mm polyps x2 in the transverse colon; flat morphology; cold biospy polypectomy and retrieved.      B. 4-7 mm polyps x4 in the ascending colon; flat morphology; three larger removed with cold snare  and one smaller with cold biopsy polypectomy and retrieved.      C. 4-5 mm polyps x2 in the descending colon; flat morphology; cold biopsy polypectomy and retrieved.    2. Diverticulosis: L > R sided.    3. Terminal ileum: the visualized mucosa appeared normal.    4. A retroflexed view of the rectum revealed hemorrhoids.    5. The colonic mucosa throughout the colon showed normal vascular pattern, without evidence of angioectasias or inflammation. Old tattoo seen in sigmoid    6. EMILE: normal rectal tone, no masses palpated.     Recommend:  Await pathology, likely repeat colonoscopy in 2-3 years. The interval for the next colonoscopy will be determined after reviewing pathology. If new signs or symptoms develop, colonoscopy may need to be repeated sooner.   High fiber diet.  Monitor for blood in the stool. If having more than just tinge of blood, call office or go to the ER.      >>>If tissue was obtained and you have not received your pathology results either by phone or letter within 2 weeks, please call our office at [phone].    Specimens: transverse, ascending and descending polyps

## 2025-05-01 RX ORDER — ATORVASTATIN CALCIUM 10 MG/1
10 TABLET, FILM COATED ORAL NIGHTLY
Qty: 90 TABLET | Refills: 3 | Status: SHIPPED | OUTPATIENT
Start: 2025-05-01

## 2025-05-02 NOTE — TELEPHONE ENCOUNTER
Refill passed per [facility] protocol.    Requested Prescriptions   Pending Prescriptions Disp Refills    ATORVASTATIN 10 MG Oral Tab [Pharmacy Med Name: ATORVASTATIN 10MG TABLETS] 90 tablet 1     Sig: TAKE 1 TABLET(10 MG) BY MOUTH EVERY NIGHT       Cholesterol Medication Protocol Passed - 5/1/2025 10:55 PM        Passed - ALT < 80     Lab Results   Component Value Date    ALT 12 10/29/2024             Passed - ALT resulted within past year        Passed - Lipid panel within past 12 months     Lab Results   Component Value Date    CHOLEST 226 (H) 10/29/2024    TRIG 128 10/29/2024    HDL 69 (H) 10/29/2024     (H) 10/29/2024    VLDL 23 10/29/2024    NONHDLC 157 (H) 10/29/2024             Passed - In person appointment or virtual visit in the past 12 mos or appointment in next 3 mos     Recent Outpatient Visits              5 months ago Screening for colon cancer    [facility]    Nurse Only    6 months ago Welcome to Medicare preventive visit    [facility] Weiler, Colleen M, DO    Office Visit    1 year ago Screening for colon cancer    [facility]    Nurse Only    1 year ago Essential hypertension    [facility] Weiler, Colleen M, DO    Office Visit    1 year ago Screening for colon cancer    [facility] Weiler, Colleen M, DO    Office Visit          Future Appointments         Provider Department Appt Notes    In 5 months Weiler, Colleen M, DO [facility] SUPERVISIT - 10/2024                    Passed - Medication is active on med list

## (undated) DEVICE — V2 SPECIMEN COLLECTION MANIFOLD KIT: Brand: NEPTUNE

## (undated) DEVICE — KIT ENDO ORCAPOD 160/180/190

## (undated) DEVICE — Device

## (undated) DEVICE — TRAP POLYP W/ 2 SPEC TY CLR MAGNIFYING WIND

## (undated) DEVICE — MEDI-VAC NON-CONDUCTIVE SUCTION TUBING 6MM X 1.8M (6FT.) L: Brand: CARDINAL HEALTH

## (undated) DEVICE — KIT CLEAN ENDOKIT 1.1OZ GOWNX2

## (undated) DEVICE — LASSO POLYPECTOMY SNARE: Brand: LASSO

## (undated) DEVICE — 60 ML SYRINGE REGULAR TIP: Brand: MONOJECT

## (undated) DEVICE — STERILE LATEX POWDER-FREE SURGICAL GLOVESWITH NITRILE COATING: Brand: PROTEXIS

## (undated) DEVICE — GIJAW SINGLE-USE BIOPSY FORCEPS WITH NEEDLE: Brand: GIJAW

## (undated) NOTE — LETTER
[facility]  [address], [place], [place]    Authorization for Surgical Operation and Procedure                               I hereby authorize Bhumika Marks MD, my physician and his/her assistants (if applicable), which may include medical students, residents, and/or fellows, to perform the following surgical operation/ procedure and administer such anesthesia as may be determined necessary by my physician: Operation/Procedure name (s) COLONOSCOPY on Brii Phelps   2.   I recognize that during the surgical operation/procedure, unforeseen conditions may necessitate additional or different procedures than those listed above.  I, therefore, further authorize and request that the above-named surgeon, assistants, or designees perform such procedures as are, in their judgment, necessary and desirable.    3.   My surgeon/physician has discussed prior to my surgery the potential benefits, risks and side effects of this procedure; the likelihood of achieving goals; and potential problems that might occur during recuperation.  They also discussed reasonable alternatives to the procedure, including risks, benefits, and side effects related to the alternatives and risks related to not receiving this procedure.  I have had all my questions answered and I acknowledge that no guarantee has been made as to the result that may be obtained.    4.   Should the need arise during my operation/procedure, which includes change of level of care prior to discharge, I also consent to the administration of blood and/or blood products.  Further, I understand that despite careful testing and screening of blood or blood products by collecting agencies, I may still be subject to ill effects as a result of receiving a blood transfusion and/or blood products.  The following are some, but not all, of the potential risks that can occur: fever and allergic reactions, hemolytic reactions, transmission of diseases such as  Hepatitis, AIDS and Cytomegalovirus (CMV) and fluid overload.  In the event that I wish to have an autologous transfusion of my own blood, or a directed donor transfusion, I will discuss this with my physician.  Check only if Refusing Blood or Blood Products  I understand refusal of blood or blood products as deemed necessary by my physician may have serious consequences to my condition to include possible death. I hereby assume responsibility for my refusal and release the hospital, its personnel, and my physicians from any responsibility for the consequences of my refusal.    o  Refuse   5.   I authorize the use of any specimen, organs, tissues, body parts or foreign objects that may be removed from my body during the operation/procedure for diagnosis, research or teaching purposes and their subsequent disposal by hospital authorities.  I also authorize the release of specimen test results and/or written reports to my treating physician on the hospital medical staff or other referring or consulting physicians involved in my care, at the discretion of the Pathologist or my treating physician.    6.   I consent to the photographing or videotaping of the operations or procedures to be performed, including appropriate portions of my body for medical, scientific, or educational purposes, provided my identity is not revealed by the pictures or by descriptive texts accompanying them.  If the procedure has been photographed/videotaped, the surgeon will obtain the original picture, image, videotape or CD.  The hospital will not be responsible for storage, release or maintenance of the picture, image, tape or CD.    7.   I consent to the presence of a  or observers in the operating room as deemed necessary by my physician or their designees.    8.   I recognize that in the event my procedure results in extended X-Ray/fluoroscopy time, I may develop a skin reaction.    9. If I have a Do Not Attempt  Resuscitation (DNAR) order in place, that status will be suspended while in the operating room, procedural suite, and during the recovery period unless otherwise explicitly stated by me (or a person authorized to consent on my behalf). The surgeon or my attending physician will determine when the applicable recovery period ends for purposes of reinstating the DNAR order.  10. Patients having a sterilization procedure: I understand that if the procedure is successful the results will be permanent and it will therefore be impossible for me to inseminate, conceive, or bear children.  I also understand that the procedure is intended to result in sterility, although the result has not been guaranteed.   11. I acknowledge that my physician has explained sedation/analgesia administration to me including the risk and benefits I consent to the administration of sedation/analgesia as may be necessary or desirable in the judgment of my physician.    I CERTIFY THAT I HAVE READ AND FULLY UNDERSTAND THE ABOVE CONSENT TO OPERATION and/or OTHER PROCEDURE.     ____________________________________  _________________________________        ______________________________  Signature of Patient    Signature of Responsible Person                Printed Name of Responsible Person                                      ____________________________________  _____________________________                ________________________________  Signature of Witness        Date  Time         Relationship to Patient    STATEMENT OF PHYSICIAN My signature below affirms that prior to the time of the procedure; I have explained to the patient and/or his/her legal representative, the risks and benefits involved in the proposed treatment and any reasonable alternative to the proposed treatment. I have also explained the risks and benefits involved in refusal of the proposed treatment and alternatives to the proposed treatment and have answered the patient's  questions. If I have a significant financial interest in a co-management agreement or a significant financial interest in any product or implant, or other significant relationship used in this procedure/surgery, I have disclosed this and had a discussion with my patient.     _____________________________________________________              _____________________________  (Signature of Physician)                                                                                         (Date)                                   (Time)  Patient Name: Brii Phelps      : 1/15/1960      Printed: 2025     Medical Record #: [id number]                                      Page 1 of 1

## (undated) NOTE — ED AVS SNAPSHOT
[facility] in [address]    [address] [facility] [address]    Phone:  [phone]    Fax:  Grzegorzgen Erickson Mattie   MRN: [id number]    Department:  THE [facility] in [address] [place] [address],[address]   Date of Visit:  4/13/2017 Where to Get Your Medications      You can get these medications from any pharmacy     Bring a paper prescription for each of these medications    - Amoxicillin-Pot Clavulanate 875-125 MG Tabs  - HYDROcodone-acetaminophen 5-325 MG Tabs              Dischar receive this, we would really appreciate it if you could take the time to complete it. Thank you! You were examined and treated today on an urgent basis only. This was not a substitute for ongoing medical care.  Often, one Immediate Care visit does no [address]  [facility] ([address]) [facility] [address] ([address]) [address]  [place] ([address]) 21  [address] ([address]) [address] foot.         FINDINGS:          There is a 2 mm osseous density along the inferomedial aspect of the head of the first metatarsal which could represent a tiny chip fracture or a preexistent ossicle. No other potential acute fracture is noted.  There is celine

## (undated) NOTE — LETTER
[facility]  Administration of Anesthesia  I, Brii Phelps agree to be cared for by a physician anesthesiologist alone and/or with a nurse anesthetist, who is specially trained to monitor me and give me medicine to put me to sleep or keep me comfortable during my procedure    I understand that my anesthesiologist and/or anesthetist is not an employee or agent of [facility] or [employer]. He or she works for [facility]    As the patient asking for anesthesia services, I agree to:  Allow the anesthesiologist (anesthesia doctor) to give me medicine and do additional procedures as necessary. Some examples are: Starting or using an “IV” to give me medicine, fluids or blood during my procedure, and having a breathing tube placed to help me breathe when I’m asleep (intubation). In the event that my heart stops working properly, I understand that my anesthesiologist will make every effort to sustain my life, unless otherwise directed by [facility] Do Not Resuscitate documents.  Tell my anesthesia doctor before my procedure:  If I am pregnant.  The last time that I ate or drank.  iii. All of the medicines I take (including prescriptions, herbal supplements, and pills I can buy without a prescription (including street drugs/illegal medications). Failure to inform my anesthesiologist about these medicines may increase my risk of anesthetic complications.  iv.If I am allergic to anything or have had a reaction to anesthesia before.  I understand how the anesthesia medicine will help me (benefits).  I understand that with any type of anesthesia medicine there are risks:  The most common risks are: nausea, vomiting, sore throat, muscle soreness, damage to my eyes, mouth, or teeth (from breathing tube placement).  Rare risks include: remembering what happened during my procedure, allergic reactions to medications, injury to my airway, heart, lungs, vision, nerves, or  muscles and in extremely rare instances death.  My doctor has explained to me other choices available to me for my care (alternatives).  Pregnant Patients (“epidural”):  I understand that the risks of having an epidural (medicine given into my back to help control pain during labor), include itching, low blood pressure, difficulty urinating, headache or slowing of the baby’s heart. Very rare risks include infection, bleeding, seizure, irregular heart rhythms and nerve injury.  Regional Anesthesia (“spinal”, “epidural”, & “nerve blocks”):  I understand that rare but potential complications include headache, bleeding, infection, seizure, irregular heart rhythms, and nerve injury.    _____________________________________________________________________________  Patient (or Representative) Signature/Relationship to Patient  Date   Time    _____________________________________________________________________________   Name (if used)    Language/Organization   Time    _____________________________________________________________________________  Nurse Anesthetist Signature     Date   Time  _____________________________________________________________________________  Anesthesiologist Signature     Date   Time  I have discussed the procedure and information above with the patient (or patient’s representative) and answered their questions. The patient or their representative has agreed to have anesthesia services.    _____________________________________________________________________________  Witness        Date   Time  I have verified that the signature is that of the patient or patient’s representative, and that it was signed before the procedure  Patient Name: Brii Phelps     : 1/15/1960                 Printed: 2025 at 2:28 PM    Medical Record #: [id number]                                            Page 1 of 1  ----------ANESTHESIA CONSENT----------